# Patient Record
Sex: FEMALE | Race: WHITE
[De-identification: names, ages, dates, MRNs, and addresses within clinical notes are randomized per-mention and may not be internally consistent; named-entity substitution may affect disease eponyms.]

---

## 2019-02-07 ENCOUNTER — HOSPITAL ENCOUNTER (EMERGENCY)
Dept: HOSPITAL 8 - ED | Age: 59
Discharge: LEFT BEFORE DISCHARGE | End: 2019-02-07
Payer: COMMERCIAL

## 2019-02-07 DIAGNOSIS — Z53.21: Primary | ICD-10-CM

## 2019-11-02 ENCOUNTER — HOSPITAL ENCOUNTER (OUTPATIENT)
Facility: MEDICAL CENTER | Age: 59
End: 2019-11-04
Attending: EMERGENCY MEDICINE | Admitting: INTERNAL MEDICINE

## 2019-11-02 ENCOUNTER — APPOINTMENT (OUTPATIENT)
Dept: RADIOLOGY | Facility: MEDICAL CENTER | Age: 59
End: 2019-11-02
Attending: EMERGENCY MEDICINE

## 2019-11-02 DIAGNOSIS — E87.5 HYPERKALEMIA: ICD-10-CM

## 2019-11-02 DIAGNOSIS — R11.2 NAUSEA AND VOMITING, INTRACTABILITY OF VOMITING NOT SPECIFIED, UNSPECIFIED VOMITING TYPE: ICD-10-CM

## 2019-11-02 DIAGNOSIS — I45.9 HEART BLOCK: ICD-10-CM

## 2019-11-02 PROBLEM — R07.9 PAIN IN THE CHEST: Status: ACTIVE | Noted: 2019-11-02

## 2019-11-02 PROBLEM — I44.1 SECOND DEGREE AV BLOCK: Status: ACTIVE | Noted: 2019-11-02

## 2019-11-02 PROBLEM — D69.6 THROMBOCYTOPENIA (HCC): Status: ACTIVE | Noted: 2019-11-02

## 2019-11-02 PROBLEM — D72.829 LEUCOCYTOSIS: Status: ACTIVE | Noted: 2019-11-02

## 2019-11-02 LAB
ALBUMIN SERPL BCP-MCNC: 4.8 G/DL (ref 3.2–4.9)
ALBUMIN/GLOB SERPL: 1.5 G/DL
ALP SERPL-CCNC: 74 U/L (ref 30–99)
ALT SERPL-CCNC: 42 U/L (ref 2–50)
ANION GAP SERPL CALC-SCNC: 10 MMOL/L (ref 0–11.9)
ANION GAP SERPL CALC-SCNC: 9 MMOL/L (ref 0–11.9)
AST SERPL-CCNC: 45 U/L (ref 12–45)
BASOPHILS # BLD AUTO: 0.4 % (ref 0–1.8)
BASOPHILS # BLD: 0.06 K/UL (ref 0–0.12)
BILIRUB SERPL-MCNC: 0.5 MG/DL (ref 0.1–1.5)
BUN SERPL-MCNC: 12 MG/DL (ref 8–22)
BUN SERPL-MCNC: 13 MG/DL (ref 8–22)
CALCIUM SERPL-MCNC: 9.6 MG/DL (ref 8.5–10.5)
CALCIUM SERPL-MCNC: 9.7 MG/DL (ref 8.5–10.5)
CHLORIDE SERPL-SCNC: 100 MMOL/L (ref 96–112)
CHLORIDE SERPL-SCNC: 99 MMOL/L (ref 96–112)
CO2 SERPL-SCNC: 23 MMOL/L (ref 20–33)
CO2 SERPL-SCNC: 24 MMOL/L (ref 20–33)
CREAT SERPL-MCNC: 0.97 MG/DL (ref 0.5–1.4)
CREAT SERPL-MCNC: 1.01 MG/DL (ref 0.5–1.4)
D DIMER PPP IA.FEU-MCNC: <0.4 UG/ML (FEU) (ref 0–0.5)
EKG IMPRESSION: NORMAL
EOSINOPHIL # BLD AUTO: 0.04 K/UL (ref 0–0.51)
EOSINOPHIL NFR BLD: 0.3 % (ref 0–6.9)
ERYTHROCYTE [DISTWIDTH] IN BLOOD BY AUTOMATED COUNT: 48.3 FL (ref 35.9–50)
GLOBULIN SER CALC-MCNC: 3.3 G/DL (ref 1.9–3.5)
GLUCOSE SERPL-MCNC: 101 MG/DL (ref 65–99)
GLUCOSE SERPL-MCNC: 101 MG/DL (ref 65–99)
HCT VFR BLD AUTO: 44.3 % (ref 37–47)
HGB BLD-MCNC: 14.8 G/DL (ref 12–16)
IMM GRANULOCYTES # BLD AUTO: 0.05 K/UL (ref 0–0.11)
IMM GRANULOCYTES NFR BLD AUTO: 0.4 % (ref 0–0.9)
LYMPHOCYTES # BLD AUTO: 1.19 K/UL (ref 1–4.8)
LYMPHOCYTES NFR BLD: 8.4 % (ref 22–41)
MCH RBC QN AUTO: 34.3 PG (ref 27–33)
MCHC RBC AUTO-ENTMCNC: 33.4 G/DL (ref 33.6–35)
MCV RBC AUTO: 102.8 FL (ref 81.4–97.8)
MONOCYTES # BLD AUTO: 1 K/UL (ref 0–0.85)
MONOCYTES NFR BLD AUTO: 7.1 % (ref 0–13.4)
NEUTROPHILS # BLD AUTO: 11.76 K/UL (ref 2–7.15)
NEUTROPHILS NFR BLD: 83.4 % (ref 44–72)
NRBC # BLD AUTO: 0 K/UL
NRBC BLD-RTO: 0 /100 WBC
PLATELET # BLD AUTO: 91 K/UL (ref 164–446)
PMV BLD AUTO: 9.9 FL (ref 9–12.9)
POTASSIUM SERPL-SCNC: 6.2 MMOL/L (ref 3.6–5.5)
POTASSIUM SERPL-SCNC: 6.4 MMOL/L (ref 3.6–5.5)
PROT SERPL-MCNC: 8.1 G/DL (ref 6–8.2)
RBC # BLD AUTO: 4.31 M/UL (ref 4.2–5.4)
SODIUM SERPL-SCNC: 131 MMOL/L (ref 135–145)
SODIUM SERPL-SCNC: 134 MMOL/L (ref 135–145)
TROPONIN T SERPL-MCNC: 7 NG/L (ref 6–19)
TROPONIN T SERPL-MCNC: <6 NG/L (ref 6–19)
WBC # BLD AUTO: 14.1 K/UL (ref 4.8–10.8)

## 2019-11-02 PROCEDURE — 85025 COMPLETE CBC W/AUTO DIFF WBC: CPT

## 2019-11-02 PROCEDURE — A9270 NON-COVERED ITEM OR SERVICE: HCPCS | Performed by: INTERNAL MEDICINE

## 2019-11-02 PROCEDURE — 82533 TOTAL CORTISOL: CPT

## 2019-11-02 PROCEDURE — 93005 ELECTROCARDIOGRAM TRACING: CPT

## 2019-11-02 PROCEDURE — 700111 HCHG RX REV CODE 636 W/ 250 OVERRIDE (IP): Performed by: INTERNAL MEDICINE

## 2019-11-02 PROCEDURE — 85379 FIBRIN DEGRADATION QUANT: CPT

## 2019-11-02 PROCEDURE — 96375 TX/PRO/DX INJ NEW DRUG ADDON: CPT

## 2019-11-02 PROCEDURE — G0378 HOSPITAL OBSERVATION PER HR: HCPCS

## 2019-11-02 PROCEDURE — 700111 HCHG RX REV CODE 636 W/ 250 OVERRIDE (IP): Performed by: EMERGENCY MEDICINE

## 2019-11-02 PROCEDURE — 84484 ASSAY OF TROPONIN QUANT: CPT

## 2019-11-02 PROCEDURE — 71045 X-RAY EXAM CHEST 1 VIEW: CPT

## 2019-11-02 PROCEDURE — 99220 PR INITIAL OBSERVATION CARE,LEVL III: CPT | Mod: AI | Performed by: INTERNAL MEDICINE

## 2019-11-02 PROCEDURE — 80048 BASIC METABOLIC PNL TOTAL CA: CPT

## 2019-11-02 PROCEDURE — 99285 EMERGENCY DEPT VISIT HI MDM: CPT

## 2019-11-02 PROCEDURE — 36415 COLL VENOUS BLD VENIPUNCTURE: CPT

## 2019-11-02 PROCEDURE — 700105 HCHG RX REV CODE 258: Performed by: INTERNAL MEDICINE

## 2019-11-02 PROCEDURE — 93005 ELECTROCARDIOGRAM TRACING: CPT | Performed by: EMERGENCY MEDICINE

## 2019-11-02 PROCEDURE — 84443 ASSAY THYROID STIM HORMONE: CPT

## 2019-11-02 PROCEDURE — 700111 HCHG RX REV CODE 636 W/ 250 OVERRIDE (IP)

## 2019-11-02 PROCEDURE — 700102 HCHG RX REV CODE 250 W/ 637 OVERRIDE(OP): Performed by: INTERNAL MEDICINE

## 2019-11-02 PROCEDURE — 80053 COMPREHEN METABOLIC PANEL: CPT

## 2019-11-02 PROCEDURE — 93005 ELECTROCARDIOGRAM TRACING: CPT | Performed by: INTERNAL MEDICINE

## 2019-11-02 PROCEDURE — 96365 THER/PROPH/DIAG IV INF INIT: CPT

## 2019-11-02 PROCEDURE — 96367 TX/PROPH/DG ADDL SEQ IV INF: CPT

## 2019-11-02 RX ORDER — POLYETHYLENE GLYCOL 3350 17 G/17G
1 POWDER, FOR SOLUTION ORAL
Status: DISCONTINUED | OUTPATIENT
Start: 2019-11-02 | End: 2019-11-04 | Stop reason: HOSPADM

## 2019-11-02 RX ORDER — PROCHLORPERAZINE EDISYLATE 5 MG/ML
5-10 INJECTION INTRAMUSCULAR; INTRAVENOUS EVERY 4 HOURS PRN
Status: DISCONTINUED | OUTPATIENT
Start: 2019-11-02 | End: 2019-11-04 | Stop reason: HOSPADM

## 2019-11-02 RX ORDER — ONDANSETRON 4 MG/1
4 TABLET, ORALLY DISINTEGRATING ORAL ONCE
Status: COMPLETED | OUTPATIENT
Start: 2019-11-02 | End: 2019-11-02

## 2019-11-02 RX ORDER — REGADENOSON 0.08 MG/ML
0.4 INJECTION, SOLUTION INTRAVENOUS
Status: COMPLETED | OUTPATIENT
Start: 2019-11-02 | End: 2019-11-03

## 2019-11-02 RX ORDER — ONDANSETRON 2 MG/ML
4 INJECTION INTRAMUSCULAR; INTRAVENOUS ONCE
Status: DISPENSED | OUTPATIENT
Start: 2019-11-02 | End: 2019-11-03

## 2019-11-02 RX ORDER — GABAPENTIN 600 MG/1
1200 TABLET ORAL
COMMUNITY

## 2019-11-02 RX ORDER — ASPIRIN 300 MG/1
300 SUPPOSITORY RECTAL DAILY
Status: DISCONTINUED | OUTPATIENT
Start: 2019-11-03 | End: 2019-11-03

## 2019-11-02 RX ORDER — GABAPENTIN 300 MG/1
300 CAPSULE ORAL 3 TIMES DAILY
Status: DISCONTINUED | OUTPATIENT
Start: 2019-11-02 | End: 2019-11-04 | Stop reason: HOSPADM

## 2019-11-02 RX ORDER — SODIUM CHLORIDE 9 MG/ML
INJECTION, SOLUTION INTRAVENOUS CONTINUOUS
Status: DISCONTINUED | OUTPATIENT
Start: 2019-11-02 | End: 2019-11-03

## 2019-11-02 RX ORDER — AMINOPHYLLINE 25 MG/ML
100 INJECTION, SOLUTION INTRAVENOUS
Status: DISCONTINUED | OUTPATIENT
Start: 2019-11-02 | End: 2019-11-04 | Stop reason: HOSPADM

## 2019-11-02 RX ORDER — ACETAMINOPHEN 325 MG/1
650 TABLET ORAL EVERY 6 HOURS PRN
Status: DISCONTINUED | OUTPATIENT
Start: 2019-11-02 | End: 2019-11-04 | Stop reason: HOSPADM

## 2019-11-02 RX ORDER — PROMETHAZINE HYDROCHLORIDE 25 MG/1
12.5-25 TABLET ORAL EVERY 4 HOURS PRN
Status: DISCONTINUED | OUTPATIENT
Start: 2019-11-02 | End: 2019-11-04 | Stop reason: HOSPADM

## 2019-11-02 RX ORDER — AMOXICILLIN 250 MG
2 CAPSULE ORAL 2 TIMES DAILY
Status: DISCONTINUED | OUTPATIENT
Start: 2019-11-02 | End: 2019-11-04 | Stop reason: HOSPADM

## 2019-11-02 RX ORDER — TIZANIDINE HYDROCHLORIDE 2 MG/1
2 CAPSULE, GELATIN COATED ORAL 3 TIMES DAILY
Status: ON HOLD | COMMUNITY
End: 2019-11-04

## 2019-11-02 RX ORDER — BISACODYL 10 MG
10 SUPPOSITORY, RECTAL RECTAL
Status: DISCONTINUED | OUTPATIENT
Start: 2019-11-02 | End: 2019-11-04 | Stop reason: HOSPADM

## 2019-11-02 RX ORDER — TIZANIDINE 4 MG/1
2 TABLET ORAL EVERY 8 HOURS PRN
Status: DISCONTINUED | OUTPATIENT
Start: 2019-11-02 | End: 2019-11-03

## 2019-11-02 RX ORDER — ONDANSETRON 2 MG/ML
4 INJECTION INTRAMUSCULAR; INTRAVENOUS EVERY 4 HOURS PRN
Status: DISCONTINUED | OUTPATIENT
Start: 2019-11-02 | End: 2019-11-04 | Stop reason: HOSPADM

## 2019-11-02 RX ORDER — HYDROCODONE BITARTRATE AND ACETAMINOPHEN 10; 325 MG/1; MG/1
1 TABLET ORAL EVERY 6 HOURS
Status: ON HOLD | COMMUNITY
End: 2019-11-04

## 2019-11-02 RX ORDER — PROMETHAZINE HYDROCHLORIDE 25 MG/1
12.5-25 SUPPOSITORY RECTAL EVERY 4 HOURS PRN
Status: DISCONTINUED | OUTPATIENT
Start: 2019-11-02 | End: 2019-11-04 | Stop reason: HOSPADM

## 2019-11-02 RX ORDER — ASPIRIN 325 MG
325 TABLET ORAL DAILY
Status: DISCONTINUED | OUTPATIENT
Start: 2019-11-03 | End: 2019-11-03

## 2019-11-02 RX ORDER — ASPIRIN 81 MG/1
324 TABLET, CHEWABLE ORAL DAILY
Status: DISCONTINUED | OUTPATIENT
Start: 2019-11-03 | End: 2019-11-03

## 2019-11-02 RX ORDER — MORPHINE SULFATE 4 MG/ML
4 INJECTION, SOLUTION INTRAMUSCULAR; INTRAVENOUS ONCE
Status: COMPLETED | OUTPATIENT
Start: 2019-11-02 | End: 2019-11-02

## 2019-11-02 RX ORDER — ONDANSETRON 4 MG/1
4 TABLET, ORALLY DISINTEGRATING ORAL EVERY 4 HOURS PRN
Status: DISCONTINUED | OUTPATIENT
Start: 2019-11-02 | End: 2019-11-04 | Stop reason: HOSPADM

## 2019-11-02 RX ADMIN — DEXTROSE MONOHYDRATE 250 ML: 100 INJECTION, SOLUTION INTRAVENOUS at 19:42

## 2019-11-02 RX ADMIN — SODIUM CHLORIDE: 9 INJECTION, SOLUTION INTRAVENOUS at 21:37

## 2019-11-02 RX ADMIN — PROCHLORPERAZINE EDISYLATE 10 MG: 5 INJECTION INTRAMUSCULAR; INTRAVENOUS at 19:37

## 2019-11-02 RX ADMIN — INSULIN HUMAN 10 UNITS: 100 INJECTION, SOLUTION PARENTERAL at 19:46

## 2019-11-02 RX ADMIN — ONDANSETRON 4 MG: 4 TABLET, ORALLY DISINTEGRATING ORAL at 17:39

## 2019-11-02 RX ADMIN — MORPHINE SULFATE 4 MG: 4 INJECTION INTRAVENOUS at 18:12

## 2019-11-02 RX ADMIN — GABAPENTIN 300 MG: 300 CAPSULE ORAL at 19:35

## 2019-11-02 RX ADMIN — CALCIUM GLUCONATE 1000 MG: 98 INJECTION, SOLUTION INTRAVENOUS at 19:42

## 2019-11-02 RX ADMIN — SODIUM CHLORIDE: 9 INJECTION, SOLUTION INTRAVENOUS at 19:40

## 2019-11-02 SDOH — HEALTH STABILITY: MENTAL HEALTH: HOW OFTEN DO YOU HAVE A DRINK CONTAINING ALCOHOL?: 2-4 TIMES A MONTH

## 2019-11-02 SDOH — HEALTH STABILITY: MENTAL HEALTH: HOW OFTEN DO YOU HAVE 6 OR MORE DRINKS ON ONE OCCASION?: NEVER

## 2019-11-02 SDOH — HEALTH STABILITY: MENTAL HEALTH: HOW MANY STANDARD DRINKS CONTAINING ALCOHOL DO YOU HAVE ON A TYPICAL DAY?: 1 OR 2

## 2019-11-02 ASSESSMENT — LIFESTYLE VARIABLES
DO YOU DRINK ALCOHOL: NO
ALCOHOL_USE: YES
EVER_SMOKED: YES
HAVE YOU EVER FELT YOU SHOULD CUT DOWN ON YOUR DRINKING: NO
CONSUMPTION TOTAL: NEGATIVE
TOTAL SCORE: 0
EVER HAD A DRINK FIRST THING IN THE MORNING TO STEADY YOUR NERVES TO GET RID OF A HANGOVER: NO
ON A TYPICAL DAY WHEN YOU DRINK ALCOHOL HOW MANY DRINKS DO YOU HAVE: 2
HOW MANY TIMES IN THE PAST YEAR HAVE YOU HAD 5 OR MORE DRINKS IN A DAY: 0
TOTAL SCORE: 0
AVERAGE NUMBER OF DAYS PER WEEK YOU HAVE A DRINK CONTAINING ALCOHOL: 1
TOTAL SCORE: 0
EVER FELT BAD OR GUILTY ABOUT YOUR DRINKING: NO
HAVE PEOPLE ANNOYED YOU BY CRITICIZING YOUR DRINKING: NO

## 2019-11-02 ASSESSMENT — COGNITIVE AND FUNCTIONAL STATUS - GENERAL
MOVING FROM LYING ON BACK TO SITTING ON SIDE OF FLAT BED: A LITTLE
SUGGESTED CMS G CODE MODIFIER MOBILITY: CJ
DAILY ACTIVITIY SCORE: 24
MOBILITY SCORE: 21
WALKING IN HOSPITAL ROOM: A LITTLE
SUGGESTED CMS G CODE MODIFIER DAILY ACTIVITY: CH
CLIMB 3 TO 5 STEPS WITH RAILING: A LITTLE

## 2019-11-02 ASSESSMENT — ENCOUNTER SYMPTOMS
COUGH: 0
EYE PAIN: 0
CHILLS: 0
NAUSEA: 1
EYE REDNESS: 0
HEARTBURN: 0
PALPITATIONS: 0
VOMITING: 1
EYE DISCHARGE: 0
MYALGIAS: 0
SHORTNESS OF BREATH: 0
INSOMNIA: 0
SENSORY CHANGE: 1
ABDOMINAL PAIN: 0
STRIDOR: 0
WEIGHT LOSS: 0
SPUTUM PRODUCTION: 0
BACK PAIN: 0
DIZZINESS: 0
ORTHOPNEA: 0
NECK PAIN: 0
DEPRESSION: 0
FEVER: 0
SEIZURES: 0
HEADACHES: 0
NERVOUS/ANXIOUS: 0
BLURRED VISION: 0
DIARRHEA: 0
FOCAL WEAKNESS: 0

## 2019-11-02 ASSESSMENT — COPD QUESTIONNAIRES
DO YOU EVER COUGH UP ANY MUCUS OR PHLEGM?: NO/ONLY WITH OCCASIONAL COLDS OR INFECTIONS
COPD SCREENING SCORE: 3
DURING THE PAST 4 WEEKS HOW MUCH DID YOU FEEL SHORT OF BREATH: NONE/LITTLE OF THE TIME
HAVE YOU SMOKED AT LEAST 100 CIGARETTES IN YOUR ENTIRE LIFE: YES
IN THE PAST 12 MONTHS DO YOU DO LESS THAN YOU USED TO BECAUSE OF YOUR BREATHING PROBLEMS: DISAGREE/UNSURE

## 2019-11-02 ASSESSMENT — PATIENT HEALTH QUESTIONNAIRE - PHQ9
2. FEELING DOWN, DEPRESSED, IRRITABLE, OR HOPELESS: NOT AT ALL
SUM OF ALL RESPONSES TO PHQ9 QUESTIONS 1 AND 2: 0
1. LITTLE INTEREST OR PLEASURE IN DOING THINGS: NOT AT ALL

## 2019-11-03 ENCOUNTER — APPOINTMENT (OUTPATIENT)
Dept: RADIOLOGY | Facility: MEDICAL CENTER | Age: 59
End: 2019-11-03
Attending: INTERNAL MEDICINE

## 2019-11-03 PROBLEM — D75.89 MACROCYTOSIS: Status: ACTIVE | Noted: 2019-11-03

## 2019-11-03 PROBLEM — E78.5 HYPERLIPIDEMIA: Status: ACTIVE | Noted: 2019-11-03

## 2019-11-03 LAB
ALBUMIN SERPL BCP-MCNC: 4.6 G/DL (ref 3.2–4.9)
ALBUMIN/GLOB SERPL: 1.5 G/DL
ALP SERPL-CCNC: 70 U/L (ref 30–99)
ALT SERPL-CCNC: 39 U/L (ref 2–50)
ANION GAP SERPL CALC-SCNC: 9 MMOL/L (ref 0–11.9)
AST SERPL-CCNC: 42 U/L (ref 12–45)
BILIRUB SERPL-MCNC: 0.6 MG/DL (ref 0.1–1.5)
BUN SERPL-MCNC: 17 MG/DL (ref 8–22)
CALCIUM SERPL-MCNC: 9.7 MG/DL (ref 8.5–10.5)
CHLORIDE SERPL-SCNC: 101 MMOL/L (ref 96–112)
CHOLEST SERPL-MCNC: 244 MG/DL (ref 100–199)
CO2 SERPL-SCNC: 25 MMOL/L (ref 20–33)
CORTIS SERPL-MCNC: 47.8 UG/DL (ref 0–23)
CREAT SERPL-MCNC: 0.9 MG/DL (ref 0.5–1.4)
ERYTHROCYTE [DISTWIDTH] IN BLOOD BY AUTOMATED COUNT: 47.8 FL (ref 35.9–50)
GLOBULIN SER CALC-MCNC: 3 G/DL (ref 1.9–3.5)
GLUCOSE SERPL-MCNC: 125 MG/DL (ref 65–99)
HCT VFR BLD AUTO: 44.9 % (ref 37–47)
HDLC SERPL-MCNC: 63 MG/DL
HGB BLD-MCNC: 14.9 G/DL (ref 12–16)
LDLC SERPL CALC-MCNC: 163 MG/DL
MCH RBC QN AUTO: 34.3 PG (ref 27–33)
MCHC RBC AUTO-ENTMCNC: 33.2 G/DL (ref 33.6–35)
MCV RBC AUTO: 103.2 FL (ref 81.4–97.8)
PLATELET # BLD AUTO: 80 K/UL (ref 164–446)
PMV BLD AUTO: 10.8 FL (ref 9–12.9)
POTASSIUM SERPL-SCNC: 5.2 MMOL/L (ref 3.6–5.5)
PROT SERPL-MCNC: 7.6 G/DL (ref 6–8.2)
RBC # BLD AUTO: 4.35 M/UL (ref 4.2–5.4)
SODIUM SERPL-SCNC: 135 MMOL/L (ref 135–145)
TRIGL SERPL-MCNC: 88 MG/DL (ref 0–149)
TROPONIN T SERPL-MCNC: 11 NG/L (ref 6–19)
TSH SERPL DL<=0.005 MIU/L-ACNC: 1.05 UIU/ML (ref 0.38–5.33)
WBC # BLD AUTO: 14 K/UL (ref 4.8–10.8)

## 2019-11-03 PROCEDURE — 700102 HCHG RX REV CODE 250 W/ 637 OVERRIDE(OP): Performed by: HOSPITALIST

## 2019-11-03 PROCEDURE — G0378 HOSPITAL OBSERVATION PER HR: HCPCS

## 2019-11-03 PROCEDURE — 85027 COMPLETE CBC AUTOMATED: CPT

## 2019-11-03 PROCEDURE — 80053 COMPREHEN METABOLIC PANEL: CPT

## 2019-11-03 PROCEDURE — 84484 ASSAY OF TROPONIN QUANT: CPT

## 2019-11-03 PROCEDURE — A9502 TC99M TETROFOSMIN: HCPCS

## 2019-11-03 PROCEDURE — 700111 HCHG RX REV CODE 636 W/ 250 OVERRIDE (IP): Performed by: INTERNAL MEDICINE

## 2019-11-03 PROCEDURE — A9270 NON-COVERED ITEM OR SERVICE: HCPCS | Performed by: HOSPITALIST

## 2019-11-03 PROCEDURE — 96372 THER/PROPH/DIAG INJ SC/IM: CPT

## 2019-11-03 PROCEDURE — A9270 NON-COVERED ITEM OR SERVICE: HCPCS | Performed by: INTERNAL MEDICINE

## 2019-11-03 PROCEDURE — 700102 HCHG RX REV CODE 250 W/ 637 OVERRIDE(OP): Performed by: INTERNAL MEDICINE

## 2019-11-03 PROCEDURE — 700105 HCHG RX REV CODE 258: Performed by: INTERNAL MEDICINE

## 2019-11-03 PROCEDURE — 99226 PR SUBSEQUENT OBSERVATION CARE,LEVEL III: CPT | Performed by: HOSPITALIST

## 2019-11-03 PROCEDURE — 36415 COLL VENOUS BLD VENIPUNCTURE: CPT

## 2019-11-03 PROCEDURE — 700111 HCHG RX REV CODE 636 W/ 250 OVERRIDE (IP)

## 2019-11-03 PROCEDURE — 80061 LIPID PANEL: CPT

## 2019-11-03 RX ORDER — ATORVASTATIN CALCIUM 40 MG/1
40 TABLET, FILM COATED ORAL EVERY EVENING
Status: DISCONTINUED | OUTPATIENT
Start: 2019-11-03 | End: 2019-11-04 | Stop reason: HOSPADM

## 2019-11-03 RX ORDER — HYDROCODONE BITARTRATE AND ACETAMINOPHEN 5; 325 MG/1; MG/1
1 TABLET ORAL EVERY 8 HOURS PRN
Status: DISCONTINUED | OUTPATIENT
Start: 2019-11-03 | End: 2019-11-04

## 2019-11-03 RX ORDER — FUROSEMIDE 10 MG/ML
10 INJECTION INTRAMUSCULAR; INTRAVENOUS ONCE
Status: DISCONTINUED | OUTPATIENT
Start: 2019-11-03 | End: 2019-11-03

## 2019-11-03 RX ORDER — REGADENOSON 0.08 MG/ML
INJECTION, SOLUTION INTRAVENOUS
Status: COMPLETED
Start: 2019-11-03 | End: 2019-11-03

## 2019-11-03 RX ORDER — FUROSEMIDE 10 MG/ML
10 INJECTION INTRAMUSCULAR; INTRAVENOUS ONCE
Status: COMPLETED | OUTPATIENT
Start: 2019-11-03 | End: 2019-11-04

## 2019-11-03 RX ADMIN — REGADENOSON 0.4 MG: 0.08 INJECTION, SOLUTION INTRAVENOUS at 10:30

## 2019-11-03 RX ADMIN — HYDROCODONE BITARTRATE AND ACETAMINOPHEN 1 TABLET: 5; 325 TABLET ORAL at 16:45

## 2019-11-03 RX ADMIN — SENNOSIDES AND DOCUSATE SODIUM 2 TABLET: 8.6; 5 TABLET ORAL at 17:27

## 2019-11-03 RX ADMIN — GABAPENTIN 300 MG: 300 CAPSULE ORAL at 11:37

## 2019-11-03 RX ADMIN — SENNOSIDES AND DOCUSATE SODIUM 2 TABLET: 8.6; 5 TABLET ORAL at 04:52

## 2019-11-03 RX ADMIN — ATORVASTATIN CALCIUM 40 MG: 40 TABLET, FILM COATED ORAL at 17:27

## 2019-11-03 RX ADMIN — GABAPENTIN 300 MG: 300 CAPSULE ORAL at 04:52

## 2019-11-03 RX ADMIN — GABAPENTIN 300 MG: 300 CAPSULE ORAL at 17:27

## 2019-11-03 RX ADMIN — ENOXAPARIN SODIUM 40 MG: 40 INJECTION SUBCUTANEOUS at 04:52

## 2019-11-03 RX ADMIN — SODIUM CHLORIDE: 9 INJECTION, SOLUTION INTRAVENOUS at 07:48

## 2019-11-03 RX ADMIN — ASPIRIN 325 MG: 325 TABLET, FILM COATED ORAL at 04:52

## 2019-11-03 ASSESSMENT — ENCOUNTER SYMPTOMS
HEADACHES: 0
SENSORY CHANGE: 0
SPUTUM PRODUCTION: 0
LOSS OF CONSCIOUSNESS: 0
NECK PAIN: 0
CHILLS: 0
EYE PAIN: 0
WHEEZING: 0
NAUSEA: 0
BRUISES/BLEEDS EASILY: 0
FOCAL WEAKNESS: 0
CONSTIPATION: 0
BACK PAIN: 0
SPEECH CHANGE: 0
DIARRHEA: 0
DIAPHORESIS: 0
PALPITATIONS: 0
FEVER: 0
VOMITING: 0
SORE THROAT: 0
EYE DISCHARGE: 0
ABDOMINAL PAIN: 0
CLAUDICATION: 0
MYALGIAS: 0
DEPRESSION: 0
HEMOPTYSIS: 0
COUGH: 0
DIZZINESS: 0
WEAKNESS: 0
SHORTNESS OF BREATH: 0

## 2019-11-03 ASSESSMENT — LIFESTYLE VARIABLES: SUBSTANCE_ABUSE: 0

## 2019-11-03 NOTE — PROGRESS NOTES
Received pt report from ED RN.    Pt awake, alert, oriented X 4.  Pt resting in bed. Pt up to the bathroom with standby assist with FWW.   Bed in low locked position, call bell at the bedside, tray table & personal belongings within reach. Non-skid footwear intact. White board updated to reflect plan of care for current shift. Pt door tags reviewed. Bed Alarm ON.    Assessed patient’s Neuro Status, Comfort Level, Immediate Needs, Admission Screen, 2-RN Skin Note Completed.    Vitals WNL.    Tele SB with 2'TII HB.    Tommie Keenan

## 2019-11-03 NOTE — ASSESSMENT & PLAN NOTE
ERP discussed with Dr. Luther: could be related to her nausea/vomiting related vasovagal?  Correct hyperkalemia  11/3:  Converted to NSR.

## 2019-11-03 NOTE — ED NOTES
Pt ambulated steady to & from restroom with standby assist. Bedside report to Tele RN. Pt transported up to floor now.

## 2019-11-03 NOTE — CARE PLAN
Problem: Communication  Goal: The ability to communicate needs accurately and effectively will improve  Outcome: PROGRESSING AS EXPECTED  Intervention: Use communication aids and/or /Language Line as appropriate  Flowsheets (Taken 11/3/2019 9194)  Patient's Primary Language: Kinyarwanda  Note:   Discussed daily POC with pt utilizing . Whiteboard updated. No further questions at this time.      Problem: Knowledge Deficit  Goal: Knowledge of the prescribed therapeutic regimen will improve  Outcome: PROGRESSING AS EXPECTED  Intervention: Discuss information regarding therpeutic regimen and document in education  Note:   Discussed IV fluids with pt. Pt verbalized understanding.

## 2019-11-03 NOTE — PROGRESS NOTES
Assumed care of patient at bedside report from NOC RN. Updated on POC. Patient currently A & O x 4, Syriac speaking only,  service in use; on 2 L O2 nasal cannula; up standby assist, steady on feet with steady gait; without complaints of acute pain. Call light within reach. Whiteboard updated. Fall precautions in place. Bed locked and in lowest position. All questions answered. No other needs indicated at this time.

## 2019-11-03 NOTE — ED NOTES
Pt actively vomiting. To medicate per EMAR for n/v & hyperkalemia. VSS. Will continue to monitor.

## 2019-11-03 NOTE — PROGRESS NOTES
Telemetry Summary:    Measurements: .22/.10/.36    Rhythm: NSR with 2' Type I & Type II HB    Ectopy: PAC's    Rate: 73-94    Tommie Keenan

## 2019-11-03 NOTE — ED NOTES
Assist RN: Spoke with MD, Dr. Bob. To give morphine at this time. Lamonte Held per MD.     Per MD to notify him if patient's rhythm changes. Primary RN aware.

## 2019-11-03 NOTE — ED NOTES
Dr. Bob notified of pt's confirmed high potassium on lab repeat. To medicate for hyperkalemia & ERP to notify hospitalist.

## 2019-11-03 NOTE — PROGRESS NOTES
2 RN Skin Check    2 RN skin check complete.       Confirmed pressure ulcers found on: NONE.  New potential pressure ulcers noted on NONE. Wound consult placed No.  The following interventions in place Mepilex.    2-RN skin assessment completed this shift with NENA Esquivel.  Skin was assessed for abnormalities head to toe and noted in detail skin note.      FACE/NECK: Intact    BACK of HEAD: Intact    EARS: Intact    CHEST: Intact    ABDOMEN: Intact    BACK: Intact    BUTTOCKS/SACRUM: Intact-Blanching    GROIN: Intact    ARMS: Intact     LEGS: Intact    FEET: Intact    This completes the 2-RN skin assessment.      Tommie Keenan

## 2019-11-03 NOTE — PROGRESS NOTES
Nuclear Medicine call to check on patient heart rhythm.      Patient has been in and out of 2' Type II HB, currently NSR at 90.      Nuc Med, would like cardiology to clear and approve the Stress Test prior to accepting patient for procedure.      Pending cardiology consult, will notify day team with concerns.      Tommie Keenan

## 2019-11-03 NOTE — CARE PLAN
Problem: Communication  Goal: The ability to communicate needs accurately and effectively will improve  Outcome: PROGRESSING AS EXPECTED  Note:   Discussed POC with patient, patient agrees to participate in POC.  Patient encouraged to use call bell to ring for assistance.  Patient oriented to room, call bell, and bed controls.  Open line of communication established with the patient.      Patient is Ukrainian Speaking, requires .       Problem: Safety  Goal: Will remain free from injury  Outcome: PROGRESSING AS EXPECTED  Note:   Instructed patient to use call bell and ring for assistance prior to ambulation.  Non-Skid foot ware in place, bed in low locked position, call bell and personal belongings within reach.       Patient understands to ring call bell for assistance.    Goal: Will remain free from falls  Outcome: PROGRESSING AS EXPECTED  Note:   Call for assistance, bed alarm on.       Problem: Knowledge Deficit  Goal: Knowledge of disease process/condition, treatment plan, diagnostic tests, and medications will improve  Outcome: PROGRESSING AS EXPECTED  Note:   Discussed plan of care and medications with patient.  Patient verbalizes understandings of treatment regimen.      Patient  Mike at the bedside discussing POC>       Problem: Pain Management  Goal: Pain level will decrease to patient's comfort goal  Outcome: PROGRESSING AS EXPECTED  Note:   Intermittent lower ABD pain associated with nausea.

## 2019-11-03 NOTE — ED NOTES
Med Rec Updated and Complete per Pt and family at bedside with RX bottles (returned)  Allergies Reviewed  No PO ABX last 14 days.    Pt states she ran out of her Norco this morning when she took her last dose.    Pt denies OTC medication at this time.

## 2019-11-03 NOTE — ED PROVIDER NOTES
ED Provider Note    HPI: Patient is a 59-year-old female who presented to the emergency department accompanied by her  November 2, 2019 at 5:19 PM with a chief complaint of jaw pain left arm pain and right leg pain.  Para graph please note that the patient speaks very limited English and her  translates per    Patient does have chronic low back pain for which she takes Norco.  She developed the above symptoms earlier today.  She is had some dizziness.  No fever no chills no cough.  No change in bladder bowel habits.  Jaw pain and left arm pain seem to come and go.  She is had no pain in the chest as far as I can determine.  No abdominal pain no leg swelling.  No shortness of breath.  No other somatic compl        Review of Systems: Positive for jaw pain left arm pain that is intermittent negative shortness of breath chest pain abdominal pain leg swelling.  Review of systems reviewed with patient, all other systems negative     Past medical/surgical history: Chronic back pain ankle fracture     Medications: Zanaflex Norco Neurontin     Allergies: None     Social History: Former history of smoking occasional use of alcohol           Physical exam: Pleasant female awake alert  Vital signs: Temperature 97.9 pulse 65 respirations 22 blood pressure 135/38 pulse oximetry 93%  EYES: Pupils appear to be equal and reactive to light extraocular movements were intact.  Conjunctiva and sclera clear, eyelids normal bilaterally  Neck: Trachea midline no cervical masses seen or palpated.  Normal range of motion no meningeal signs elicited  Cardiac: Regular rate and rhythm S1-S2 present no S3 or S4 heard.  No murmurs rubs or gallops heard.  No edema or varicosities.   Lungs: Clear to auscultation with good aeration throughout.  No wheezes no rales no rhonchi.  Patient's chest wall moves symmetrically with each respiratory effort.  Patient was not making use of accessory muscles respiration and breathing  Abdomen: .   Soft nontender to palpation no rebound or guarding elicited no organomegaly or pulsatile abdominal masses identified  Musculoskeletal: No pain with palpation or movement of muscle bone or joint.  No musculoskeletal deformities identified.  Neurologic: Patient is awake and alert answers questions appropriately.  Moves all 4 extremities independently.  Motor strength sensation grossly normal throughout  Skin: Mucous membranes moist.  No rash or lesion seen, no palpable dermatologic lesions identified per  Psychiatric: Patient did not appear to be anxious, not obviously delusional or halluctinating    EKG obtained on arrival (interpretation) twelve-lead EKG sinus rhythm rate 61.  Morphology P waves QRS complexes T waves unremarkable.  Patient appears to have some sort of second-degree heart block but I had difficulty determining whether was type I or type II.  There is no evidence of ST elevation or depression.  She had some nonspecific ST changes diffusely.  Interpreted an abnormal EKG but not indicating acute ischemia.  There may be an acute dysrhythmia but I am uncertain at this time.    I medially contacted cardiology and Dr. Luther reviewed the tracing.  He also was uncertain as to whether this represented Mobitz type I or type II and thought it might simply represent vagal changes due to the patient's nausea and vomiting.  I went in the room and watch the patient on the monitor for continuous 5 minutes and the patient remained in sinus rhythm the entire time    Patient given morphine and Zofran with some improvement.    Laboratory studies obtained (please see lab sheet for all results) significant findings included a moderate leukocytosis of 14.1 of uncertain significance.  There is no increase in immature granulocytes making a systemic infection less likely.  Potassium was somewhat elevated at 6.2 but renal function is normal.  Troponin was normal.    Chest x-ray obtained; no acute abnormalities were seen.    I  have ordered a repeat potassium blood draw to check and see if this was an error, should her potassium remain elevated she will need to be admitted to a telemetry bed for cardiac monitoring.  She will need to be admitted for monitoring in any case as the cardiologist was concerned this could represent intermittent second-degree heart block perhaps Mobitz type II.  Her signs and symptoms do not seem to go along with aortic disease or pulmonary embolism the patient does not appear to be infected at this time.  I would characterize her chest pain is having at least a moderate likelihood of cardiac etiology    Further care and hospital course per attending physician summary    Impression 1) second-degree heart block, possible  2) nausea and vomiting    Addendum, 7:30 PM November 2.  Repeat blood drawn actually showed a slight increase in the patient's potassium at 6.4 with continued normal renal function.  Patient is treated for hyperkalemia per pharmacy protocol.  I spoke with the hospitalist at this time and he is aware of the above and will follow.  At this time the cause the patient's hyperkalemia is not clear    Additional impression hyperkalemia

## 2019-11-03 NOTE — ED NOTES
Report given to florencio RN, pt on monitor , resting denies needs at this time care assumed by florencio

## 2019-11-03 NOTE — ASSESSMENT & PLAN NOTE
Patient received dextrose and insulin in ER  Follow cmp in am  Monitor on tele  Concern for hypoaldosteronism:  No exogenous K use, normal renal function.  Ordered plasma renin, aldosterone and cortisol after IV lasix dose for a.m. testing.

## 2019-11-03 NOTE — H&P
Hospital Medicine History & Physical Note    Date of Service  11/2/2019    Primary Care Physician  PRABHA Carrasco.    Consultants  Dr. Luther    Code Status  full    Chief Complaint  Nausea, vomiting, chest pain    History of Presenting Illness  59 y.o. female with no significant past medical history, Emirati-speaking who presented 11/2/2019 with nausea and vomiting started earlier today.  Associated with tingling numbness sensation over her face and left arm area.  Earlier today she also had episode of chest pain.  So she finally decided to come to ER.  In the ER she has EKG done and show Mobitz type II.  Dr. Luther was consulted by ER and he was not sure whether this is a real heart block or just related to vasovagal event.  She was also found to have hyperkalemia with potassium of 6.2.  She will be admitted to telemetry floor for further evaluation and management.    Review of Systems  Review of Systems   Constitutional: Negative for chills, fever and weight loss.   HENT: Negative for congestion and nosebleeds.    Eyes: Negative for blurred vision, pain, discharge and redness.   Respiratory: Negative for cough, sputum production, shortness of breath and stridor.    Cardiovascular: Positive for chest pain. Negative for palpitations and orthopnea.   Gastrointestinal: Positive for nausea and vomiting. Negative for abdominal pain, diarrhea and heartburn.   Genitourinary: Negative for dysuria, frequency and urgency.   Musculoskeletal: Negative for back pain, myalgias and neck pain.   Skin: Negative for itching and rash.   Neurological: Positive for sensory change. Negative for dizziness, focal weakness, seizures and headaches.   Psychiatric/Behavioral: Negative for depression. The patient is not nervous/anxious and does not have insomnia.        Past Medical History   has no past medical history of Breast cancer (HCC).    Surgical History   has a past surgical history that includes pr c-sec only,prev c-sec and  ankle orif (1/27/2011).     Family History  family history includes Hypertension in her father and mother.     Social History   reports that she has quit smoking. She does not have any smokeless tobacco history on file. She reports that she drinks alcohol. She reports that she does not use drugs.    Allergies  No Known Allergies    Medications  Prior to Admission Medications   Prescriptions Last Dose Informant Patient Reported? Taking?   gabapentin (NEURONTIN) 300 MG CAPS   No No   Sig: Take 1 Cap by mouth 3 times a day.   hydrocodone-acetaminophen (NORCO) 5-325 MG TABS per tablet   No No   Sig: Take 1-2 Tabs by mouth every 6 hours as needed (for pain).   tizanidine (ZANAFLEX) 2 MG capsule   Yes Yes   Sig: Take 2 mg by mouth 3 times a day.      Facility-Administered Medications: None       Physical Exam  Temp:  [36.6 °C (97.9 °F)] 36.6 °C (97.9 °F)  Pulse:  [65] 65  Resp:  [22] 22  BP: (135)/(38) 135/38  SpO2:  [93 %] 93 %    Physical Exam   Constitutional: She is oriented to person, place, and time. No distress.   HENT:   Head: Normocephalic and atraumatic.   Mouth/Throat: Oropharynx is clear and moist.   Eyes: Pupils are equal, round, and reactive to light. Conjunctivae and EOM are normal.   Neck: Normal range of motion. Neck supple. No tracheal deviation present. No thyromegaly present.   Cardiovascular: Normal rate and regular rhythm.   No murmur heard.  Pulmonary/Chest: Effort normal and breath sounds normal. No respiratory distress. She has no wheezes.   Abdominal: Soft. Bowel sounds are normal. She exhibits no distension. There is no tenderness.   Musculoskeletal: She exhibits no edema or tenderness.   Neurological: She is alert and oriented to person, place, and time. No cranial nerve deficit.   Skin: Skin is warm and dry. She is not diaphoretic. No erythema.   Psychiatric: She has a normal mood and affect. Her behavior is normal. Thought content normal.   Vitals reviewed.      Laboratory:  Recent Labs      11/02/19  1800   WBC 14.1*   RBC 4.31   HEMOGLOBIN 14.8   HEMATOCRIT 44.3   .8*   MCH 34.3*   MCHC 33.4*   RDW 48.3   PLATELETCT 91*   MPV 9.9     Recent Labs     11/02/19  1800   SODIUM 134*   POTASSIUM 6.2*   CHLORIDE 100   CO2 24   GLUCOSE 101*   BUN 12   CREATININE 0.97   CALCIUM 9.7     Recent Labs     11/02/19  1800   ALTSGPT 42   ASTSGOT 45   ALKPHOSPHAT 74   TBILIRUBIN 0.5   GLUCOSE 101*         No results for input(s): NTPROBNP in the last 72 hours.      Recent Labs     11/02/19  1800   TROPONINT <6       Urinalysis:    No results found     Imaging:  DX-CHEST-PORTABLE (1 VIEW)   Final Result      No acute cardiopulmonary findings.      NM-CARDIAC STRESS TEST    (Results Pending)         Assessment/Plan:  I anticipate this patient is appropriate for observation status at this time.    Second degree AV block- (present on admission)  Assessment & Plan  ERP discussed with Dr. Luther: could be related to her nausea/vomiting related vasovagal?  Correct hyperkalemia  Repeat EKG once hyperkalemia is improved    Hyperkalemia- (present on admission)  Assessment & Plan  Patient received dextrose and insulin in ER  Follow cmp in am  Monitor on tele    Thrombocytopenia (HCC)- (present on admission)  Assessment & Plan  Unclear etiology  Follow cbc in am    Leucocytosis- (present on admission)  Assessment & Plan  Likely reactive  Follow cbc in am    Pain in the chest- (present on admission)  Assessment & Plan  Risk for CAD  Trend trop and ekg   Ordered ddimer, if elevated then will check CTPE study  NPO  Stress test in am  Check lipid panel      VTE prophylaxis: lovenox

## 2019-11-04 ENCOUNTER — PATIENT OUTREACH (OUTPATIENT)
Dept: HEALTH INFORMATION MANAGEMENT | Facility: OTHER | Age: 59
End: 2019-11-04

## 2019-11-04 VITALS
BODY MASS INDEX: 28.8 KG/M2 | OXYGEN SATURATION: 94 % | HEART RATE: 84 BPM | TEMPERATURE: 98.2 F | RESPIRATION RATE: 18 BRPM | WEIGHT: 152.56 LBS | HEIGHT: 61 IN | SYSTOLIC BLOOD PRESSURE: 142 MMHG | DIASTOLIC BLOOD PRESSURE: 87 MMHG

## 2019-11-04 PROBLEM — E87.5 HYPERKALEMIA: Status: RESOLVED | Noted: 2019-11-02 | Resolved: 2019-11-04

## 2019-11-04 PROBLEM — I44.1 SECOND DEGREE AV BLOCK: Status: RESOLVED | Noted: 2019-11-02 | Resolved: 2019-11-04

## 2019-11-04 PROBLEM — R07.9 PAIN IN THE CHEST: Status: RESOLVED | Noted: 2019-11-02 | Resolved: 2019-11-04

## 2019-11-04 PROBLEM — D72.829 LEUCOCYTOSIS: Status: RESOLVED | Noted: 2019-11-02 | Resolved: 2019-11-04

## 2019-11-04 PROBLEM — D51.9 B12 DEFICIENCY ANEMIA: Status: ACTIVE | Noted: 2019-11-04

## 2019-11-04 LAB
ANION GAP SERPL CALC-SCNC: 9 MMOL/L (ref 0–11.9)
BASOPHILS # BLD AUTO: 0.4 % (ref 0–1.8)
BASOPHILS # BLD: 0.04 K/UL (ref 0–0.12)
BUN SERPL-MCNC: 21 MG/DL (ref 8–22)
CALCIUM SERPL-MCNC: 9.7 MG/DL (ref 8.5–10.5)
CHLORIDE SERPL-SCNC: 105 MMOL/L (ref 96–112)
CO2 SERPL-SCNC: 26 MMOL/L (ref 20–33)
COMMENT 1642: NORMAL
CORTIS SERPL-MCNC: 23.7 UG/DL (ref 0–23)
CREAT SERPL-MCNC: 0.65 MG/DL (ref 0.5–1.4)
EOSINOPHIL # BLD AUTO: 0.08 K/UL (ref 0–0.51)
EOSINOPHIL NFR BLD: 0.9 % (ref 0–6.9)
ERYTHROCYTE [DISTWIDTH] IN BLOOD BY AUTOMATED COUNT: 50.1 FL (ref 35.9–50)
FOLATE SERPL-MCNC: 10.6 NG/ML
GLUCOSE SERPL-MCNC: 124 MG/DL (ref 65–99)
HCT VFR BLD AUTO: 42.4 % (ref 37–47)
HGB BLD-MCNC: 14 G/DL (ref 12–16)
IMM GRANULOCYTES # BLD AUTO: 0.06 K/UL (ref 0–0.11)
IMM GRANULOCYTES NFR BLD AUTO: 0.7 % (ref 0–0.9)
LYMPHOCYTES # BLD AUTO: 1.93 K/UL (ref 1–4.8)
LYMPHOCYTES NFR BLD: 21.2 % (ref 22–41)
MCH RBC QN AUTO: 34.1 PG (ref 27–33)
MCHC RBC AUTO-ENTMCNC: 33 G/DL (ref 33.6–35)
MCV RBC AUTO: 103.2 FL (ref 81.4–97.8)
MONOCYTES # BLD AUTO: 1.19 K/UL (ref 0–0.85)
MONOCYTES NFR BLD AUTO: 13.1 % (ref 0–13.4)
MORPHOLOGY BLD-IMP: NORMAL
NEUTROPHILS # BLD AUTO: 5.81 K/UL (ref 2–7.15)
NEUTROPHILS NFR BLD: 63.7 % (ref 44–72)
NRBC # BLD AUTO: 0 K/UL
NRBC BLD-RTO: 0 /100 WBC
PLATELET # BLD AUTO: 32 K/UL (ref 164–446)
PLATELET BLD QL SMEAR: NORMAL
PLATELETS.RETICULATED NFR BLD AUTO: 7 K/UL (ref 0.6–13.1)
PMV BLD AUTO: 10.8 FL (ref 9–12.9)
POTASSIUM SERPL-SCNC: 3.8 MMOL/L (ref 3.6–5.5)
RBC # BLD AUTO: 4.11 M/UL (ref 4.2–5.4)
RBC BLD AUTO: NORMAL
SODIUM SERPL-SCNC: 140 MMOL/L (ref 135–145)
VIT B12 SERPL-MCNC: 346 PG/ML (ref 211–911)
WBC # BLD AUTO: 9.1 K/UL (ref 4.8–10.8)

## 2019-11-04 PROCEDURE — 82746 ASSAY OF FOLIC ACID SERUM: CPT

## 2019-11-04 PROCEDURE — 82607 VITAMIN B-12: CPT

## 2019-11-04 PROCEDURE — 99217 PR OBSERVATION CARE DISCHARGE: CPT | Performed by: HOSPITALIST

## 2019-11-04 PROCEDURE — A9270 NON-COVERED ITEM OR SERVICE: HCPCS | Performed by: HOSPITALIST

## 2019-11-04 PROCEDURE — 700111 HCHG RX REV CODE 636 W/ 250 OVERRIDE (IP): Performed by: HOSPITALIST

## 2019-11-04 PROCEDURE — 85025 COMPLETE CBC W/AUTO DIFF WBC: CPT

## 2019-11-04 PROCEDURE — 700102 HCHG RX REV CODE 250 W/ 637 OVERRIDE(OP): Performed by: INTERNAL MEDICINE

## 2019-11-04 PROCEDURE — G0378 HOSPITAL OBSERVATION PER HR: HCPCS

## 2019-11-04 PROCEDURE — 84244 ASSAY OF RENIN: CPT

## 2019-11-04 PROCEDURE — 700102 HCHG RX REV CODE 250 W/ 637 OVERRIDE(OP): Performed by: HOSPITALIST

## 2019-11-04 PROCEDURE — 85055 RETICULATED PLATELET ASSAY: CPT

## 2019-11-04 PROCEDURE — 36415 COLL VENOUS BLD VENIPUNCTURE: CPT

## 2019-11-04 PROCEDURE — 96375 TX/PRO/DX INJ NEW DRUG ADDON: CPT

## 2019-11-04 PROCEDURE — 96372 THER/PROPH/DIAG INJ SC/IM: CPT

## 2019-11-04 PROCEDURE — A9270 NON-COVERED ITEM OR SERVICE: HCPCS | Performed by: INTERNAL MEDICINE

## 2019-11-04 PROCEDURE — 82533 TOTAL CORTISOL: CPT

## 2019-11-04 PROCEDURE — 80048 BASIC METABOLIC PNL TOTAL CA: CPT

## 2019-11-04 PROCEDURE — 82088 ASSAY OF ALDOSTERONE: CPT

## 2019-11-04 RX ORDER — CYANOCOBALAMIN 1000 UG/ML
1000 INJECTION, SOLUTION INTRAMUSCULAR; SUBCUTANEOUS
Status: DISCONTINUED | OUTPATIENT
Start: 2019-11-04 | End: 2019-11-04 | Stop reason: HOSPADM

## 2019-11-04 RX ORDER — ATORVASTATIN CALCIUM 40 MG/1
40 TABLET, FILM COATED ORAL EVERY EVENING
Qty: 30 TAB | Refills: 3 | Status: SHIPPED | OUTPATIENT
Start: 2019-11-04 | End: 2022-06-15

## 2019-11-04 RX ORDER — CHOLECALCIFEROL (VITAMIN D3) 125 MCG
1000 CAPSULE ORAL DAILY
Status: DISCONTINUED | OUTPATIENT
Start: 2019-11-05 | End: 2019-11-04 | Stop reason: HOSPADM

## 2019-11-04 RX ADMIN — GABAPENTIN 300 MG: 300 CAPSULE ORAL at 04:22

## 2019-11-04 RX ADMIN — CYANOCOBALAMIN 1000 MCG: 1000 INJECTION, SOLUTION INTRAMUSCULAR; SUBCUTANEOUS at 09:18

## 2019-11-04 RX ADMIN — FUROSEMIDE 10 MG: 10 INJECTION, SOLUTION INTRAMUSCULAR; INTRAVENOUS at 09:17

## 2019-11-04 RX ADMIN — HYDROCODONE BITARTRATE AND ACETAMINOPHEN 1 TABLET: 5; 325 TABLET ORAL at 08:24

## 2019-11-04 RX ADMIN — SENNOSIDES AND DOCUSATE SODIUM 2 TABLET: 8.6; 5 TABLET ORAL at 04:22

## 2019-11-04 RX ADMIN — GABAPENTIN 300 MG: 300 CAPSULE ORAL at 13:34

## 2019-11-04 NOTE — PROGRESS NOTES
Hospital Medicine Daily Progress Note    Date of Service  11/3/2019    Chief Complaint  59 y.o. female admitted 11/2/2019 with N/V/chest pain    Hospital Course    11/3:  Used  #[id number].  This 58 yo female with no prior cardiac disease states she had been taking diet Mexican medications for 3 months with K, but quit 1 week ago, no other K intake, presented with K 6.2 and N/V/chest pain.  She was in Mobitz 2 heart block on admission EKG.  Her K was reduced with insulin and D50.  She converted to NSR overnight.  However, normal renal function, no exogenous K per patient.  She eats avocados, bananas only occasionally.  No K supplements.  Ordered for hypoaldosteronism work up with IV lasix, aldosterone, cortisol and renin levels 30 minutes after loop diuretic. *        Consultants/Specialty  Dr. Link reviewed symptoms and states no further cardiology work up needed.      Code Status  full    Disposition  Lives  With .  Ambulates well.  No needs anticipated once medically cleared.    Review of Systems  Review of Systems   Constitutional: Negative for chills, diaphoresis, fever and malaise/fatigue.   HENT: Negative for congestion and sore throat.    Eyes: Negative for pain and discharge.   Respiratory: Negative for cough, hemoptysis, sputum production, shortness of breath and wheezing.    Cardiovascular: Negative for chest pain, palpitations, claudication and leg swelling.   Gastrointestinal: Negative for abdominal pain, constipation, diarrhea, melena, nausea and vomiting.   Genitourinary: Negative for dysuria, frequency and urgency.   Musculoskeletal: Negative for back pain, joint pain, myalgias and neck pain.   Skin: Negative for itching and rash.   Neurological: Negative for dizziness, sensory change, speech change, focal weakness, loss of consciousness, weakness and headaches.   Endo/Heme/Allergies: Does not bruise/bleed easily.   Psychiatric/Behavioral: Negative for depression, substance  abuse and suicidal ideas.        Physical Exam  Temp:  [36 °C (96.8 °F)-37.3 °C (99.2 °F)] 36.8 °C (98.3 °F)  Pulse:  [52-92] 91  Resp:  [16-20] 20  BP: (124-146)/(51-95) 146/95  SpO2:  [90 %-97 %] 91 %    Physical Exam  Vitals signs and nursing note reviewed.   Constitutional:       General: She is not in acute distress.     Appearance: She is well-developed. She is not diaphoretic.   HENT:      Head: Normocephalic and atraumatic.      Nose: Nose normal.      Mouth/Throat:      Pharynx: No oropharyngeal exudate.   Eyes:      General: No scleral icterus.        Right eye: No discharge.         Left eye: No discharge.      Conjunctiva/sclera: Conjunctivae normal.      Pupils: Pupils are equal, round, and reactive to light.   Neck:      Musculoskeletal: Normal range of motion and neck supple.      Thyroid: No thyromegaly.      Vascular: No JVD.      Trachea: No tracheal deviation.   Cardiovascular:      Rate and Rhythm: Normal rate and regular rhythm.      Heart sounds: Normal heart sounds. No murmur. No friction rub. No gallop.    Pulmonary:      Effort: Pulmonary effort is normal. No respiratory distress.      Breath sounds: Normal breath sounds. No stridor. No wheezing or rales.   Chest:      Chest wall: No tenderness.   Abdominal:      General: Bowel sounds are normal. There is no distension.      Palpations: Abdomen is soft. There is no mass.      Tenderness: There is no tenderness. There is no guarding or rebound.   Musculoskeletal: Normal range of motion.         General: No tenderness.   Lymphadenopathy:      Cervical: No cervical adenopathy.   Skin:     General: Skin is warm and dry.      Findings: No erythema or rash.   Neurological:      Mental Status: She is alert and oriented to person, place, and time.      Cranial Nerves: No cranial nerve deficit.      Motor: No abnormal muscle tone.      Coordination: Coordination normal.   Psychiatric:         Behavior: Behavior normal.         Thought Content:  Thought content normal.         Judgment: Judgment normal.         Fluids    Intake/Output Summary (Last 24 hours) at 11/3/2019 1841  Last data filed at 11/3/2019 0600  Gross per 24 hour   Intake 1077.67 ml   Output --   Net 1077.67 ml       Laboratory  Recent Labs     11/02/19  1800 11/03/19  0000   WBC 14.1* 14.0*   RBC 4.31 4.35   HEMOGLOBIN 14.8 14.9   HEMATOCRIT 44.3 44.9   .8* 103.2*   MCH 34.3* 34.3*   MCHC 33.4* 33.2*   RDW 48.3 47.8   PLATELETCT 91* 80*   MPV 9.9 10.8     Recent Labs     11/02/19  1800 11/02/19  1842 11/03/19  0000   SODIUM 134* 131* 135   POTASSIUM 6.2* 6.4* 5.2   CHLORIDE 100 99 101   CO2 24 23 25   GLUCOSE 101* 101* 125*   BUN 12 13 17   CREATININE 0.97 1.01 0.90   CALCIUM 9.7 9.6 9.7             Recent Labs     11/03/19  0000   TRIGLYCERIDE 88   HDL 63   *       Imaging  NM-CARDIAC STRESS TEST   Final Result      DX-CHEST-PORTABLE (1 VIEW)   Final Result      No acute cardiopulmonary findings.           Assessment/Plan  Hyperlipidemia  Assessment & Plan  Started lipitor 40 mg daily.    Macrocytosis  Assessment & Plan  Ordered B12, iron, folate in a.m.    Second degree AV block- (present on admission)  Assessment & Plan  ERP discussed with Dr. Luther: could be related to her nausea/vomiting related vasovagal?  Correct hyperkalemia  11/3:  Converted to NSR.    Hyperkalemia- (present on admission)  Assessment & Plan  Patient received dextrose and insulin in ER  Follow cmp in am  Monitor on tele  Concern for hypoaldosteronism:  No exogenous K use, normal renal function.  Ordered plasma renin, aldosterone and cortisol after IV lasix dose for a.m. testing.    Thrombocytopenia (HCC)- (present on admission)  Assessment & Plan  Unclear etiology  Follow cbc in am    Leucocytosis- (present on admission)  Assessment & Plan  Likely reactive  Follow cbc in am    Pain in the chest- (present on admission)  Assessment & Plan  Risk for CAD  Trend trop and ekg   Negative d-dimer  Stress test  negative  Check lipid panel       VTE prophylaxis: lovenox

## 2019-11-04 NOTE — PROGRESS NOTES
Monitor Summary  Rhythm: SR  Rate: 86 - 98  Ectopy: 2' type II beats; down to 45 non-sustaining  0.22 / 0.10 / 0.30

## 2019-11-04 NOTE — DISCHARGE INSTRUCTIONS
Discharge Instructions    Discharged to home by car with relative. Discharged via walking, hospital escort: Refused.  Special equipment needed: Not Applicable    Be sure to schedule a follow-up appointment with your primary care doctor or any specialists as instructed.     Discharge Plan:   Diet Plan: Discussed  Activity Level: Discussed  Confirmed Follow up Appointment: Appointment Scheduled  Confirmed Symptoms Management: Discussed  Medication Reconciliation Updated: Yes  Influenza Vaccine Indication: Patient Refuses    I understand that a diet low in cholesterol, fat, and sodium is recommended for good health. Unless I have been given specific instructions below for another diet, I accept this instruction as my diet prescription.   Other diet: cardiac    Special Instructions: None    · Is patient discharged on Warfarin / Coumadin?   No     Depression / Suicide Risk    As you are discharged from this [facility] facility, it is important to learn how to keep safe from harming yourself.    Recognize the warning signs:  · Abrupt changes in personality, positive or negative- including increase in energy   · Giving away possessions  · Change in eating patterns- significant weight changes-  positive or negative  · Change in sleeping patterns- unable to sleep or sleeping all the time   · Unwillingness or inability to communicate  · Depression  · Unusual sadness, discouragement and loneliness  · Talk of wanting to die  · Neglect of personal appearance   · Rebelliousness- reckless behavior  · Withdrawal from people/activities they love  · Confusion- inability to concentrate     If you or a loved one observes any of these behaviors or has concerns about self-harm, here's what you can do:  · Talk about it- your feelings and reasons for harming yourself  · Remove any means that you might use to hurt yourself (examples: pills, rope, extension cords, firearm)  · Get professional help from the community (Mental Health,  Substance Abuse, psychological counseling)  · Do not be alone:Call your Safe Contact- someone whom you trust who will be there for you.  · Call your local CRISIS HOTLINE 082-8948 or 298-742-3493  · Call your local Children's Mobile Crisis Response Team [place] (580) 693-2398 or www.Folkstr  · Call the toll free National Suicide Prevention Hotlines   · National Suicide Prevention Lifeline 589-532-JYDO (5970)  · [place] Hope Line Network 800-SUICIDE (979-6778)        Hyperkalemia  Introduction  Hyperkalemia is when you have too much potassium in your blood. Potassium is normally removed (excreted) from your body by your kidneys. If there is too much potassium in your blood, it can affect how your heart works.  Follow these instructions at home:  · Take medicines only as told by your doctor.  · Do not take any supplements, natural products, herbs, or vitamins unless your doctor says it is okay.  · Limit your alcohol intake as told by your doctor.  · Stop illegal drug use. If you need help quitting, ask your doctor.  · Keep all follow-up visits as told by your doctor. This is important.  · If you have kidney disease, you may need to follow a low potassium diet. A  (dietitian) can help you.  Contact a doctor if:  · Your heartbeat is not regular or very slow.  · You feel dizzy (light-headed).  · You feel weak.  · You feel sick to your stomach (nauseous).  · You have tingling in your hands or feet.  · You cannot feel your hands or feet.  Get help right away if:  · You are short of breath.  · You have chest pain.  · You pass out (faint).  · You cannot move your muscles.  This information is not intended to replace advice given to you by your health care provider. Make sure you discuss any questions you have with your health care provider.  Document Released: 12/18/2006 Document Revised: 05/25/2017 Document Reviewed: 03/25/2015  © 2017 Elsevier

## 2019-11-04 NOTE — CARE PLAN
Problem: Communication  Goal: The ability to communicate needs accurately and effectively will improve  Outcome: PROGRESSING AS EXPECTED  Note:   Communication board updated. All pt questions answered. No further questions at this time. Pt encouraged to voice feelings and ask questions as they arise. Ipad  being utilized     Problem: Safety  Goal: Will remain free from injury  Outcome: PROGRESSING AS EXPECTED  Note:   Bed locked and in lowest position. Treaded socks on. Call light and belongings within reach. Patient educated to call for assistance. Patient verbalized understanding. Hourly rounding in place.

## 2019-11-04 NOTE — PROGRESS NOTES
Assumed care at 0700, bedside report received from day shift RN. Pt is SR on the telemetry monitor. Patient is AO x 4 and is resting in bed with even respiraitons. Initial assessment completed and orders reviewed. POC addressed with patient. Call light within reach and hourly rounding in place. No further questions at this time. No nausea at this time.

## 2019-11-04 NOTE — DISCHARGE SUMMARY
Discharge Summary    CHIEF COMPLAINT ON ADMISSION  Chief Complaint   Patient presents with   • N/V   • Dizziness   • Numbness     jaw, left arm, right leg, woke up with numbess   • Chest Pain     left sided, radiates into left shoulder at times   • Shortness of Breath       Reason for Admission  Vomiting; Facial Numbness; L arm a*     Admission Date  11/2/2019    CODE STATUS  Full Code    HPI & HOSPITAL COURSE  This is a 59 y.o. female here with N/V/chest pain, numbness found to have hyperkalemia 6.4.  11/3:  Used  #[id number].  This 60 yo female with no prior cardiac disease states she had been taking diet [place] medications for 3 months with K, but quit 1 week ago, no other K intake, presented with K 6.2 and N/V/chest pain.  She was in [place] 2 heart block on admission EKG.  Her K was reduced with insulin and D50.  She converted to NSR overnight.  However, normal renal function, no exogenous K per patient.  She eats avocados, bananas only occasionally.  No K supplements.  Ordered for hypoaldosteronism work up with IV lasix, aldosterone, cortisol and renin levels 30 minutes after loop diuretic. *     The patient had macrocytosis anemia, found to have vitamin B12 deficiency, IM B12 1000mcg given and po for home.  Her platelets dropped with heparin tid dosing, heparin discontinued.  Her K improved to 3.5 on day of discharge.  The patient remained in NSR on monitor, tolerating diet well, ambulating well.  It is unclear if her diet medications from [place] that contained K could have caused her hyperkalemia.      She is to stop her diet medication, no salt substitutes.  She will need repeat CBC to ensure recovery of platelets, likely dropped due to heparin, but on admission, platelets 80s.  No ASA due to low platelets.  Lipid panel with elevated cholesterol and LDL, started on lipitor 40mg po daily.    Therefore, she is discharged in good and stable condition to home with close outpatient  follow-up.    The patient met 2-midnight criteria for an inpatient stay at the time of discharge.    Discharge Date  11/4/2019    FOLLOW UP ITEMS POST DISCHARGE  Follow up CBC,BMP and cortisol, aldosterone, renin test results with PCP in 1 week.    DISCHARGE DIAGNOSES  Active Problems:    Thrombocytopenia (HCC) POA: Yes    Macrocytosis POA: Yes    Hyperlipidemia POA: Yes    B12 deficiency anemia POA: Yes  Resolved Problems:    Pain in the chest POA: Yes    Leucocytosis POA: Yes    Hyperkalemia POA: Yes    Second degree AV block POA: Yes      FOLLOW UP  No future appointments.  No follow-up provider specified.    MEDICATIONS ON DISCHARGE     Medication List      START taking these medications      Instructions   atorvastatin 40 MG Tabs  Commonly known as:  LIPITOR   Take 1 Tab by mouth every evening.  Dose:  40 mg     cyanocobalamin 1000 MCG Tabs  Start taking on:  November 5, 2019  Commonly known as:  VITAMIN B12   Take 1 Tab by mouth every day.  Dose:  1,000 mcg        CONTINUE taking these medications      Instructions   gabapentin 300 MG Caps  Commonly known as:  NEURONTIN   Take 300 mg by mouth 3 times a day.  Dose:  300 mg        STOP taking these medications    HYDROcodone/acetaminophen  MG Tabs  Commonly known as:  NORCO     tizanidine 2 MG capsule  Commonly known as:  ZANAFLEX            Allergies  No Known Allergies    DIET  Orders Placed This Encounter   Procedures   • Diet Order Cardiac     Standing Status:   Standing     Number of Occurrences:   1     Order Specific Question:   Diet:     Answer:   Cardiac [6]       ACTIVITY  As tolerated.  Weight bearing as tolerated    CONSULTATIONS  Dr. Link, phone consult    PROCEDURES   Myocardial Perfusion   Report   NUCLEAR IMAGING INTERPRETATION   No evidence of significant jeopardized viable myocardium or prior myocardial    infarction.   Normal left ventricular size, ejection fraction, and wall motion.      LINDA SMITH      MRN:    [id number]          Gender:    F      Exam Date: 11/03/2019 09:05    11/2/2019 5:41 PM     HISTORY/REASON FOR EXAM:  Chest Pain.        TECHNIQUE/EXAM DESCRIPTION AND NUMBER OF VIEWS:  Single portable view of the chest.     COMPARISON: 1/27/2011     FINDINGS:  Single portable view of the chest demonstrates a stable cardiac silhouette and mediastinal contours.     No discrete opacity, pleural fluid, or pneumothorax.     No suspicious bony lesions.     IMPRESSION:     No acute cardiopulmonary findings.    LABORATORY  Lab Results   Component Value Date    SODIUM 140 11/04/2019    POTASSIUM 3.8 11/04/2019    CHLORIDE 105 11/04/2019    CO2 26 11/04/2019    GLUCOSE 124 (H) 11/04/2019    BUN 21 11/04/2019    CREATININE 0.65 11/04/2019    CREATININE 0.7 03/28/2008        Lab Results   Component Value Date    WBC 9.1 11/04/2019    HEMOGLOBIN 14.0 11/04/2019    HEMATOCRIT 42.4 11/04/2019    PLATELETCT 32 (LL) 11/04/2019        Total time of the discharge process exceeds 45 minutes.

## 2019-11-04 NOTE — PROGRESS NOTES
Assumed care of pt, beside report received from NENA Luke. Updated on POC, call light within reach all fall precautions within place. Bed locked and lowered. Pt instructed to call for assistance before getting up. All questions answered, no other needs at this time.

## 2019-11-04 NOTE — PROGRESS NOTES
Lab called with critical lab result of Platelets of 32, Hospitalitis paged, order to hold any aspirin or heparin products, will update day team and continue to monitor.

## 2019-11-04 NOTE — CARE PLAN
Problem: Communication  Goal: The ability to communicate needs accurately and effectively will improve  Outcome: PROGRESSING AS EXPECTED  Intervention: [place] patient and significant other/support system to call light to alert staff of needs  Flowsheets (Taken 11/3/2019 1946)  Oriented to:: All of the Following : Location of Bathroom, Visiting Policy, Unit Routine, Call Light and Bedside Controls, Bedside Rail Policy, Smoking Policy, Rights and Responsibilities, Bedside Report, and Patient Education Notebook  Note:   Pt educated on POC and medications. Pt verbalized understanding.      Problem: Safety  Goal: Will remain free from falls  Outcome: PROGRESSING AS EXPECTED  Intervention: Assess risk factors for falls  Flowsheets  Taken 11/3/2019 0745 by Marly Overton RKayceN.  Pt Calls for Assistance: Yes  Taken 11/3/2019 1946 by Willam Jimenez RKayceN.  Fall Risk: Risk to Fall -  0 - 1 point  History of fall: 0  Mobility Status Assessment: 0-Ambulates & Transfers Independently. No Assistance Required  Risk for Injury-Any positive answers results in the pt being at high risk for fall related injury: Not Applicable  Note:   Pt bedside table and call-light are within reach, bed in lowest position and locked. Treaded socks on and pt has been educated on call light use and asked to call before getting up.

## 2019-11-05 LAB — ALDOST SERPL-MCNC: 4 NG/DL

## 2019-11-05 NOTE — PROGRESS NOTES
Pt discharged to home with . Pt escorted down by  walking. Discharge teaching performed. Questions answered as needed. Discharge paperwork, prescriptions, and belongings with pt.    Patient encouraged to refrain from continuing diet medications, salt substitiutes, and K goods and encouraged [facility] with [facility].

## 2019-11-06 LAB — RENIN PLAS-CCNC: 0.6 NG/ML/HR

## 2022-04-08 ENCOUNTER — APPOINTMENT (OUTPATIENT)
Dept: RADIOLOGY | Facility: MEDICAL CENTER | Age: 62
End: 2022-04-08
Attending: PHYSICIAN ASSISTANT
Payer: COMMERCIAL

## 2022-04-09 ENCOUNTER — HOSPITAL ENCOUNTER (OUTPATIENT)
Dept: RADIOLOGY | Facility: MEDICAL CENTER | Age: 62
End: 2022-04-09
Attending: PHYSICIAN ASSISTANT
Payer: COMMERCIAL

## 2022-04-09 DIAGNOSIS — M54.6 PAIN IN THORACIC SPINE: ICD-10-CM

## 2022-04-09 PROCEDURE — 72146 MRI CHEST SPINE W/O DYE: CPT

## 2022-04-09 PROCEDURE — 72148 MRI LUMBAR SPINE W/O DYE: CPT

## 2022-06-15 ENCOUNTER — HOSPITAL ENCOUNTER (INPATIENT)
Facility: MEDICAL CENTER | Age: 62
LOS: 1 days | DRG: 177 | End: 2022-06-16
Attending: EMERGENCY MEDICINE | Admitting: INTERNAL MEDICINE
Payer: COMMERCIAL

## 2022-06-15 ENCOUNTER — APPOINTMENT (OUTPATIENT)
Dept: RADIOLOGY | Facility: MEDICAL CENTER | Age: 62
DRG: 177 | End: 2022-06-15
Attending: EMERGENCY MEDICINE
Payer: COMMERCIAL

## 2022-06-15 DIAGNOSIS — J96.01 ACUTE HYPOXEMIC RESPIRATORY FAILURE DUE TO COVID-19 (HCC): ICD-10-CM

## 2022-06-15 DIAGNOSIS — R09.02 HYPOXIA: ICD-10-CM

## 2022-06-15 DIAGNOSIS — E66.01 OBESITIES, MORBID (HCC): ICD-10-CM

## 2022-06-15 DIAGNOSIS — R06.02 SHORTNESS OF BREATH: ICD-10-CM

## 2022-06-15 DIAGNOSIS — J96.01 ACUTE HYPOXEMIC RESPIRATORY FAILURE (HCC): ICD-10-CM

## 2022-06-15 DIAGNOSIS — U07.1 ACUTE HYPOXEMIC RESPIRATORY FAILURE DUE TO COVID-19 (HCC): ICD-10-CM

## 2022-06-15 DIAGNOSIS — I10 PRIMARY HYPERTENSION: ICD-10-CM

## 2022-06-15 LAB
25(OH)D3 SERPL-MCNC: 12 NG/ML (ref 30–100)
ALBUMIN SERPL BCP-MCNC: 3.9 G/DL (ref 3.2–4.9)
ALBUMIN/GLOB SERPL: 1.3 G/DL
ALP SERPL-CCNC: 160 U/L (ref 30–99)
ALT SERPL-CCNC: 41 U/L (ref 2–50)
ANION GAP SERPL CALC-SCNC: 11 MMOL/L (ref 7–16)
AST SERPL-CCNC: 32 U/L (ref 12–45)
BASOPHILS # BLD AUTO: 0.5 % (ref 0–1.8)
BASOPHILS # BLD: 0.05 K/UL (ref 0–0.12)
BILIRUB SERPL-MCNC: 0.3 MG/DL (ref 0.1–1.5)
BUN SERPL-MCNC: 17 MG/DL (ref 8–22)
CALCIUM SERPL-MCNC: 9.4 MG/DL (ref 8.5–10.5)
CHLORIDE SERPL-SCNC: 95 MMOL/L (ref 96–112)
CO2 SERPL-SCNC: 28 MMOL/L (ref 20–33)
CREAT SERPL-MCNC: 0.58 MG/DL (ref 0.5–1.4)
CRP SERPL HS-MCNC: 2.77 MG/DL (ref 0–0.75)
EOSINOPHIL # BLD AUTO: 0.36 K/UL (ref 0–0.51)
EOSINOPHIL NFR BLD: 3.7 % (ref 0–6.9)
ERYTHROCYTE [DISTWIDTH] IN BLOOD BY AUTOMATED COUNT: 56.1 FL (ref 35.9–50)
ERYTHROCYTE [SEDIMENTATION RATE] IN BLOOD BY WESTERGREN METHOD: 34 MM/HOUR (ref 0–25)
FLUAV RNA SPEC QL NAA+PROBE: NEGATIVE
FLUBV RNA SPEC QL NAA+PROBE: NEGATIVE
FOLATE SERPL-MCNC: 19.4 NG/ML
GFR SERPLBLD CREATININE-BSD FMLA CKD-EPI: 103 ML/MIN/1.73 M 2
GLOBULIN SER CALC-MCNC: 3 G/DL (ref 1.9–3.5)
GLUCOSE SERPL-MCNC: 80 MG/DL (ref 65–99)
HCT VFR BLD AUTO: 40.2 % (ref 37–47)
HGB BLD-MCNC: 13.1 G/DL (ref 12–16)
IMM GRANULOCYTES # BLD AUTO: 0.16 K/UL (ref 0–0.11)
IMM GRANULOCYTES NFR BLD AUTO: 1.6 % (ref 0–0.9)
LACTATE BLD-SCNC: 1.2 MMOL/L (ref 0.5–2)
LYMPHOCYTES # BLD AUTO: 2.02 K/UL (ref 1–4.8)
LYMPHOCYTES NFR BLD: 20.7 % (ref 22–41)
MCH RBC QN AUTO: 35.6 PG (ref 27–33)
MCHC RBC AUTO-ENTMCNC: 32.6 G/DL (ref 33.6–35)
MCV RBC AUTO: 109.2 FL (ref 81.4–97.8)
MONOCYTES # BLD AUTO: 0.6 K/UL (ref 0–0.85)
MONOCYTES NFR BLD AUTO: 6.1 % (ref 0–13.4)
NEUTROPHILS # BLD AUTO: 6.59 K/UL (ref 2–7.15)
NEUTROPHILS NFR BLD: 67.4 % (ref 44–72)
NRBC # BLD AUTO: 0.05 K/UL
NRBC BLD-RTO: 0.5 /100 WBC
NT-PROBNP SERPL IA-MCNC: 531 PG/ML (ref 0–125)
PLATELET # BLD AUTO: 261 K/UL (ref 164–446)
PMV BLD AUTO: 8.7 FL (ref 9–12.9)
POTASSIUM SERPL-SCNC: 4.7 MMOL/L (ref 3.6–5.5)
PROCALCITONIN SERPL-MCNC: 0.07 NG/ML
PROT SERPL-MCNC: 6.9 G/DL (ref 6–8.2)
RBC # BLD AUTO: 3.68 M/UL (ref 4.2–5.4)
RSV RNA SPEC QL NAA+PROBE: NEGATIVE
SARS-COV-2 RNA RESP QL NAA+PROBE: DETECTED
SODIUM SERPL-SCNC: 134 MMOL/L (ref 135–145)
SPECIMEN SOURCE: ABNORMAL
TROPONIN T SERPL-MCNC: 18 NG/L (ref 6–19)
VIT B12 SERPL-MCNC: 683 PG/ML (ref 211–911)
WBC # BLD AUTO: 9.8 K/UL (ref 4.8–10.8)

## 2022-06-15 PROCEDURE — A9270 NON-COVERED ITEM OR SERVICE: HCPCS | Performed by: STUDENT IN AN ORGANIZED HEALTH CARE EDUCATION/TRAINING PROGRAM

## 2022-06-15 PROCEDURE — 80053 COMPREHEN METABOLIC PANEL: CPT

## 2022-06-15 PROCEDURE — 96374 THER/PROPH/DIAG INJ IV PUSH: CPT

## 2022-06-15 PROCEDURE — 36415 COLL VENOUS BLD VENIPUNCTURE: CPT

## 2022-06-15 PROCEDURE — 700111 HCHG RX REV CODE 636 W/ 250 OVERRIDE (IP): Performed by: STUDENT IN AN ORGANIZED HEALTH CARE EDUCATION/TRAINING PROGRAM

## 2022-06-15 PROCEDURE — 87040 BLOOD CULTURE FOR BACTERIA: CPT

## 2022-06-15 PROCEDURE — 0241U HCHG SARS-COV-2 COVID-19 NFCT DS RESP RNA 4 TRGT MIC: CPT

## 2022-06-15 PROCEDURE — 99223 1ST HOSP IP/OBS HIGH 75: CPT | Mod: GC | Performed by: INTERNAL MEDICINE

## 2022-06-15 PROCEDURE — 85025 COMPLETE CBC W/AUTO DIFF WBC: CPT

## 2022-06-15 PROCEDURE — 82607 VITAMIN B-12: CPT

## 2022-06-15 PROCEDURE — 770020 HCHG ROOM/CARE - TELE (206)

## 2022-06-15 PROCEDURE — 84484 ASSAY OF TROPONIN QUANT: CPT

## 2022-06-15 PROCEDURE — 84145 PROCALCITONIN (PCT): CPT

## 2022-06-15 PROCEDURE — 83880 ASSAY OF NATRIURETIC PEPTIDE: CPT

## 2022-06-15 PROCEDURE — 82746 ASSAY OF FOLIC ACID SERUM: CPT

## 2022-06-15 PROCEDURE — 99285 EMERGENCY DEPT VISIT HI MDM: CPT

## 2022-06-15 PROCEDURE — 700102 HCHG RX REV CODE 250 W/ 637 OVERRIDE(OP): Performed by: STUDENT IN AN ORGANIZED HEALTH CARE EDUCATION/TRAINING PROGRAM

## 2022-06-15 PROCEDURE — 83605 ASSAY OF LACTIC ACID: CPT

## 2022-06-15 PROCEDURE — 85652 RBC SED RATE AUTOMATED: CPT

## 2022-06-15 PROCEDURE — C9803 HOPD COVID-19 SPEC COLLECT: HCPCS | Performed by: EMERGENCY MEDICINE

## 2022-06-15 PROCEDURE — 82306 VITAMIN D 25 HYDROXY: CPT

## 2022-06-15 PROCEDURE — 86140 C-REACTIVE PROTEIN: CPT

## 2022-06-15 PROCEDURE — 71045 X-RAY EXAM CHEST 1 VIEW: CPT

## 2022-06-15 RX ORDER — BISACODYL 10 MG
10 SUPPOSITORY, RECTAL RECTAL
Status: DISCONTINUED | OUTPATIENT
Start: 2022-06-15 | End: 2022-06-16 | Stop reason: HOSPADM

## 2022-06-15 RX ORDER — POLYETHYLENE GLYCOL 3350 17 G/17G
1 POWDER, FOR SOLUTION ORAL
Status: DISCONTINUED | OUTPATIENT
Start: 2022-06-15 | End: 2022-06-15

## 2022-06-15 RX ORDER — HYDRALAZINE HYDROCHLORIDE 20 MG/ML
10 INJECTION INTRAMUSCULAR; INTRAVENOUS EVERY 4 HOURS PRN
Status: DISCONTINUED | OUTPATIENT
Start: 2022-06-15 | End: 2022-06-16 | Stop reason: HOSPADM

## 2022-06-15 RX ORDER — GABAPENTIN 300 MG/1
600 CAPSULE ORAL 2 TIMES DAILY
Status: DISCONTINUED | OUTPATIENT
Start: 2022-06-15 | End: 2022-06-16 | Stop reason: HOSPADM

## 2022-06-15 RX ORDER — PREDNISOLONE ACETATE 10 MG/ML
1 SUSPENSION/ DROPS OPHTHALMIC 2 TIMES DAILY
COMMUNITY
End: 2022-12-19

## 2022-06-15 RX ORDER — AMOXICILLIN 250 MG
2 CAPSULE ORAL 2 TIMES DAILY PRN
Status: DISCONTINUED | OUTPATIENT
Start: 2022-06-15 | End: 2022-06-16 | Stop reason: HOSPADM

## 2022-06-15 RX ORDER — FAMOTIDINE 20 MG/1
20 TABLET, FILM COATED ORAL 2 TIMES DAILY
Status: DISCONTINUED | OUTPATIENT
Start: 2022-06-16 | End: 2022-06-16 | Stop reason: HOSPADM

## 2022-06-15 RX ORDER — HYDROCODONE BITARTRATE AND ACETAMINOPHEN 10; 325 MG/1; MG/1
1 TABLET ORAL EVERY 4 HOURS PRN
Status: SHIPPED | COMMUNITY
End: 2022-06-15

## 2022-06-15 RX ORDER — POLYETHYLENE GLYCOL 3350 17 G/17G
1 POWDER, FOR SOLUTION ORAL
Status: DISCONTINUED | OUTPATIENT
Start: 2022-06-15 | End: 2022-06-16 | Stop reason: HOSPADM

## 2022-06-15 RX ORDER — METHOCARBAMOL 750 MG/1
750-1500 TABLET, FILM COATED ORAL 3 TIMES DAILY PRN
COMMUNITY
End: 2022-12-19

## 2022-06-15 RX ORDER — ACETAMINOPHEN 325 MG/1
650 TABLET ORAL EVERY 6 HOURS PRN
Status: DISCONTINUED | OUTPATIENT
Start: 2022-06-15 | End: 2022-06-16 | Stop reason: HOSPADM

## 2022-06-15 RX ORDER — METHYLPREDNISOLONE 4 MG/1
4 TABLET ORAL DAILY
Status: ON HOLD | COMMUNITY
Start: 2022-06-06 | End: 2022-06-16

## 2022-06-15 RX ORDER — HYDROCODONE BITARTRATE AND ACETAMINOPHEN 10; 325 MG/1; MG/1
1 TABLET ORAL EVERY 4 HOURS PRN
COMMUNITY

## 2022-06-15 RX ORDER — METHOCARBAMOL 750 MG/1
750 TABLET, FILM COATED ORAL 3 TIMES DAILY PRN
Status: DISCONTINUED | OUTPATIENT
Start: 2022-06-15 | End: 2022-06-16 | Stop reason: HOSPADM

## 2022-06-15 RX ORDER — FUROSEMIDE 10 MG/ML
20 INJECTION INTRAMUSCULAR; INTRAVENOUS ONCE
Status: COMPLETED | OUTPATIENT
Start: 2022-06-15 | End: 2022-06-15

## 2022-06-15 RX ORDER — OMEPRAZOLE 20 MG/1
40 CAPSULE, DELAYED RELEASE ORAL DAILY
Status: DISCONTINUED | OUTPATIENT
Start: 2022-06-15 | End: 2022-06-15

## 2022-06-15 RX ORDER — DEXAMETHASONE 6 MG/1
6 TABLET ORAL DAILY
Status: DISCONTINUED | OUTPATIENT
Start: 2022-06-15 | End: 2022-06-16

## 2022-06-15 RX ORDER — BISACODYL 10 MG
10 SUPPOSITORY, RECTAL RECTAL
Status: DISCONTINUED | OUTPATIENT
Start: 2022-06-15 | End: 2022-06-15

## 2022-06-15 RX ORDER — AMOXICILLIN 250 MG
2 CAPSULE ORAL 2 TIMES DAILY
Status: DISCONTINUED | OUTPATIENT
Start: 2022-06-15 | End: 2022-06-15

## 2022-06-15 RX ORDER — HYDROCODONE BITARTRATE AND ACETAMINOPHEN 10; 325 MG/1; MG/1
1 TABLET ORAL EVERY 4 HOURS PRN
Status: DISCONTINUED | OUTPATIENT
Start: 2022-06-15 | End: 2022-06-16 | Stop reason: HOSPADM

## 2022-06-15 RX ORDER — IPRATROPIUM BROMIDE AND ALBUTEROL SULFATE 2.5; .5 MG/3ML; MG/3ML
3 SOLUTION RESPIRATORY (INHALATION)
Status: DISCONTINUED | OUTPATIENT
Start: 2022-06-15 | End: 2022-06-16 | Stop reason: HOSPADM

## 2022-06-15 RX ADMIN — FUROSEMIDE 20 MG: 10 INJECTION, SOLUTION INTRAMUSCULAR; INTRAVENOUS at 17:38

## 2022-06-15 RX ADMIN — DEXAMETHASONE 6 MG: 4 TABLET ORAL at 16:41

## 2022-06-15 RX ADMIN — OMEPRAZOLE 40 MG: 20 CAPSULE, DELAYED RELEASE ORAL at 16:41

## 2022-06-15 RX ADMIN — GABAPENTIN 600 MG: 300 CAPSULE ORAL at 17:52

## 2022-06-15 RX ADMIN — HYDROCODONE BITARTRATE AND ACETAMINOPHEN 1 TABLET: 10; 325 TABLET ORAL at 16:45

## 2022-06-15 RX ADMIN — METHOCARBAMOL 750 MG: 750 TABLET ORAL at 21:05

## 2022-06-15 RX ADMIN — RIVAROXABAN 10 MG: 10 TABLET, FILM COATED ORAL at 17:38

## 2022-06-15 ASSESSMENT — ENCOUNTER SYMPTOMS
SPUTUM PRODUCTION: 0
SORE THROAT: 0
WEAKNESS: 0
FLANK PAIN: 0
PALPITATIONS: 0
FEVER: 0
DEPRESSION: 0
CONSTIPATION: 0
SHORTNESS OF BREATH: 0
EYE REDNESS: 0
BLOOD IN STOOL: 0
PND: 0
BRUISES/BLEEDS EASILY: 0
MYALGIAS: 0
HEADACHES: 0
ORTHOPNEA: 0
FOCAL WEAKNESS: 0
WHEEZING: 1
CHILLS: 0
DIARRHEA: 0
BLURRED VISION: 0
LOSS OF CONSCIOUSNESS: 0
BACK PAIN: 1
VOMITING: 0
COUGH: 0
TREMORS: 0
ABDOMINAL PAIN: 0
NAUSEA: 0
NERVOUS/ANXIOUS: 0

## 2022-06-15 ASSESSMENT — PAIN DESCRIPTION - PAIN TYPE: TYPE: CHRONIC PAIN

## 2022-06-15 NOTE — ED NOTES
Med rec completed per patient and spouse at bedside, and patient's pharmacies [email or web address] on [address] ([phone]) and [facility] on [address] ([phone]). Spouse translated for patient.  Allergies reviewed with spouse. NKDA.  No outpatient antibiotics in the last 30 days.    Per [facility], patient is prescribed gabapentin 600 mg with directions to take 1 tablet 3 times per day; per spouse, patient takes 2 tablets (1,200 mg) at bedtime only.

## 2022-06-15 NOTE — ED TRIAGE NOTES
"63 y/o female to triage by w/c with    Chief Complaint   Patient presents with   • Shortness of Breath     Started yesterday, was seen by [facility].   \"65% oxygen sat on room air, corrects to 97% on 4L NC\"    • Rib Pain     X 3-4 weeks      Hx of Lumbar Kyphoplasty   JOSE Mobley PA-C [phone]  Pt had xray done today  "

## 2022-06-15 NOTE — ED NOTES
Patient ambulated to restroom without oxygen. Upon getting back to room she was 70% on RA, placed back on 3L NC and patient now at 97%. Admitting MD at bedside

## 2022-06-15 NOTE — ED PROVIDER NOTES
"ED Provider Note    Scribed for Manuel Coronel M.D. by Tessa Patino. 6/15/2022  12:15 PM    Primary care provider: No primary care provider noted  Means of arrival: Walk-in  History obtained from: Patient  History limited by: None    CHIEF COMPLAINT  Chief Complaint   Patient presents with   • Shortness of Breath     Started yesterday, was seen by [facility].   \"65% oxygen sat on room air, corrects to 97% on 4L NC\"    • Rib Pain     X 3-4 weeks        HPI  Kayla Hernández is a 62 y.o. female who presents to the Emergency Department for shortness of breath onset 3 days ago. The patient states she was seen by [facility] to follow up after a recent kyphoplasty procedure performed last month when they incidentally detected hypoxia. She is additionally experiencing cough, rib pain, and body aches. She denies fevers, chills, nausea, nasal congestion, and leg swelling. She states she is fully vaccinated against COVID-19.     REVIEW OF SYSTEMS  Pertinent negatives include no fevers, chills, nausea, nasal congestion, and leg swelling. As above, all other systems reviewed and are negative.   See HPI for further details.     PAST MEDICAL HISTORY   Patient has history of back pain.    SURGICAL HISTORY   has a past surgical history that includes c-sec only,prev c-sec and orif, ankle (1/27/2011).    SOCIAL HISTORY  Social History     Tobacco Use   • Smoking status: Former Smoker   • Smokeless tobacco: Never Used   Substance Use Topics   • Alcohol use: Yes     Alcohol/week: 1.2 oz     Types: 2 Cans of beer per week     Comment: occas   • Drug use: No      Social History     Substance and Sexual Activity   Drug Use No       FAMILY HISTORY  Family History   Problem Relation Age of Onset   • Hypertension Mother    • Hypertension Father        CURRENT MEDICATIONS  Current Outpatient Medications   Medication Instructions   • atorvastatin (LIPITOR) 40 mg, Oral, EVERY EVENING   • cyanocobalamin (VITAMIN B12) " 1,000 mcg, Oral, DAILY   • gabapentin (NEURONTIN) 300 mg, Oral, 3 TIMES DAILY       ALLERGIES  No Known Allergies    PHYSICAL EXAM  VITAL SIGNS: BP (!) 146/98   Pulse 82   Temp 36.8 °C (98.2 °F) (Temporal)   Resp 16   Wt 68 kg (149 lb 14.6 oz)   SpO2 92%   BMI 28.33 kg/m²   Constitutional: Well developed, Well nourished, No acute distress, Non-toxic appearance.   HENT: Normocephalic, Atraumatic, Bilateral external ears normal, Oropharynx is clear mucous membranes are moist. No oral exudates or nasal discharge.   Eyes: Pupils are equal round and reactive, EOMI, Conjunctiva normal, No discharge.   Neck: Normal range of motion, No tenderness, Supple, No stridor. No meningismus.  Lymphatic: No lymphadenopathy noted.   Cardiovascular: Regular rate and rhythm without murmur rub or gallop.  Thorax & Lungs: Mild respiratory distress. Bilateral rhonchi. No wheezes or rales. There is no chest wall tenderness.   Abdomen: Soft non-tender non-distended. There is no rebound or guarding. No organomegaly is appreciated. Bowel sounds are normal.  Skin: Normal without rash.   Back: No CVA or spinal tenderness.   Extremities: Intact distal pulses, No edema, No tenderness, No cyanosis, No clubbing. Capillary refill is less than 2 seconds.  Musculoskeletal: Good range of motion in all major joints. No tenderness to palpation or major deformities noted.   Neurologic: Alert & oriented x 3, Normal motor function, Normal sensory function, No focal deficits noted. Reflexes are normal.  Psychiatric: Affect normal, Judgment normal, Mood normal. There is no suicidal ideation or patient reported hallucinations.     DIAGNOSTIC STUDIES / PROCEDURES    LABS  Labs Reviewed   CBC WITH DIFFERENTIAL - Abnormal; Notable for the following components:       Result Value    RBC 3.68 (*)     .2 (*)     MCH 35.6 (*)     MCHC 32.6 (*)     RDW 56.1 (*)     MPV 8.7 (*)     Lymphocytes 20.70 (*)     Immature Granulocytes 1.60 (*)     Immature  "Granulocytes (abs) 0.16 (*)     All other components within normal limits   COMP METABOLIC PANEL - Abnormal; Notable for the following components:    Sodium 134 (*)     Chloride 95 (*)     Alkaline Phosphatase 160 (*)     All other components within normal limits   LACTIC ACID   COV-2, FLU A/B, AND RSV BY PCR (CEPHEID)   ESTIMATED GFR   LACTIC ACID   LACTIC ACID   BLOOD CULTURE    Narrative:     Per Hospital Policy: Only change Specimen Src: to \"Line\" if  specified by physician order.   BLOOD CULTURE      All labs reviewed by me.    RADIOLOGY  DX-CHEST-PORTABLE (1 VIEW)   Final Result      1.  No acute cardiopulmonary abnormality identified.      2.  Enlarged cardiac silhouette           The radiologist's interpretation of all radiological studies have been reviewed by me.    COURSE & MEDICAL DECISION MAKING  Nursing notes, VS, PMSFHx reviewed in chart.    12:15 PM - Patient seen and examined at bedside. Ordered for labs and DX-Chest to evaluate. I informed her she likely has pneumonia and discussed likely plan for hospitalization at this time for persistent hypoxia. Patient verbalizes understanding and agreement to this plan of care.     We sent off COVID and influenza testing which is still pending.    Laboratory evaluation reveals no leukocytosis, shift, anemia, significant electrolyte derangements or acidosis.  Alkaline phosphatase is only slightly high at 160 and lactate is reasonable at 1.2 and I doubt significant sepsis.    Chest x-ray shows an enlarged cardiac silhouette but no definitive lobar pneumonia or mass.    Again I have a high suspicion of COVID induced hypoxia and much less suspicious for pulmonary embolism or lobar pneumonia.  Also moderately suspicious for influenza    Holding off on antibiotics until results of COVID influenza test are known    Spoke with [facility] service at approximately 1:50 PM and they will admit the patient to the hospital and treat accordingly if COVID-positive versus " influenza positive or if both tests are negative    DISPOSITION:  Patient will be hospitalized in guarded condition.     FINAL IMPRESSION  1. Hypoxia    2. Shortness of breath    3. Obesities, morbid (HCC)    4. Primary hypertension          Tessa CALVIN (Scribe), am scribing for, and in the presence of, Manuel Coronel M.D..    Electronically signed by: Tessa Patino (Scribe), 6/15/2022    IManuel M.D. personally performed the services described in this documentation, as scribed by Tessa Patino in my presence, and it is both accurate and complete.    The note accurately reflects work and decisions made by me.  Manuel Coronel M.D.  6/15/2022  1:56 PM

## 2022-06-15 NOTE — H&P
History & Physical Note    Date of Admission: 6/15/2022  Admission Status: Inpatient  [facility] Team: [facility]  Attending: Ryan Mcclure M.D.   Senior Resident: Dr. Marlene Chandler MD  Intern: Dr. Len Carlson DO  Contact Number: [phone]    Chief Complaint: Rib pain and hypoxia    History of Present Illness (HPI):  Ms. Hernández is a 62-year-old female with a past medical history of CVA in 2006 with left facial numbness, sciatica, chronic back pain s/p lumbar fusion in 2006, on chronic opiates, distant smoking history, who presents today with acute hypoxia as well as bilateral rib pain.    Patient is Sinhala-speaking with  at bedside interpreting.  States that about 3 days ago patient started developing bilateral lower rib pain that she states is worse when she takes a deep breath in.  Notes that she had a kyphoplasty about 1 month ago and initially thought that the pain is secondary to return of her back pain.  Patient states that her back pain has been ongoing for multiple years and she has had lumbar fusion in 2008.  Patient denies any other symptoms besides her chest wall pain.  States that the pain stays in her lower ribs and does not radiate anywhere.  He has not tried any over-the-counter medications or topical medications to help with the pain.  States that she has never had similar issues.  Yesterday, while the patient was asleep, her  noticed that she was starting to turn purple and seem like she was having a hard time breathing.  Patient states that she does not recall this and feels fine overall.  Patient denies any recent illness, notes that they were recently at a party on 6/4 where an attendant was found to be COVID-positive and symptomatic. Because of the change in color as well as rib pain, patient was taken to the urgent care earlier today, was found to be hypoxic down to around 65% and was placed on 4 L nasal cannula and [facility].    In the ED, patient was ambulated and desaturated down to about 70% before being placed on 2 L of oxygen with quick recovery.  Patient was found to be hypertensive with a blood pressure up to 179/85, nontachycardic nontachypneic, afebrile, and oxygenating above 90% on 2 L nasal cannula.  CBC is remarkable for an elevated MCV of 109.2, elevated RDW of 56.1, CMP without significant abnormalities, lactate normal, and chest x-ray which showed an enlarged cardiac silhouette without any acute abnormalities.  COVID test was found to be positive.  Therefore, patient will be admitted to telemetry for close monitoring for acute hypoxic respiratory failure likely secondary to COVID infection.    Review of Systems:  Review of Systems   Constitutional: Positive for malaise/fatigue. Negative for chills and fever.   HENT: Negative for congestion and sore throat.    Eyes: Negative for blurred vision and redness.   Respiratory: Positive for wheezing. Negative for cough, sputum production and shortness of breath.    Cardiovascular: Positive for chest pain (bilateral chest walls). Negative for palpitations, orthopnea, leg swelling and PND.   Gastrointestinal: Negative for abdominal pain, blood in stool, constipation, diarrhea, melena, nausea and vomiting.   Genitourinary: Negative for dysuria, flank pain, frequency and urgency.   Musculoskeletal: Positive for back pain. Negative for myalgias.   Skin: Negative for itching and rash.   Neurological: Negative for tremors, focal weakness, loss of consciousness, weakness and headaches.   Endo/Heme/Allergies: Does not bruise/bleed easily.   Psychiatric/Behavioral: Negative for depression. The patient is not nervous/anxious.      Past Medical History:   Past Medical History was reviewed with patient.  She has a history of chronic back pain on chronic opiate medications, previous stroke in 2006 with residual facial numbness.    Past Surgical History: Past Surgical History was  reviewed with patient.   has a past surgical history that includes pr c-sec only,prev c-sec and orif, ankle (1/27/2011).  Patient also notes that she had a lumbar fusion in 2008, and kyphoplasty in May 2022.    Medications: Medications have been reviewed with patient.  Prior to Admission Medications   Prescriptions Last Dose Informant Patient Reported? Taking?   BIOTIN PO 6/14/2022 at AM Significant Other Yes Yes   Sig: Take 1 Tablet by mouth every day.   HYDROcodone/acetaminophen (NORCO)  MG Tab 6/15/2022 at 1000 Patient's Home Pharmacy Yes Yes   Sig: Take 1 Tablet by mouth every four hours as needed for Severe Pain.   gabapentin (NEURONTIN) 600 MG tablet 6/14/2022 at 2300 Significant Other Yes No   Sig: Take 1,200 mg by mouth at bedtime. 2 tablets = 1,200 mg.   methocarbamol (ROBAXIN) 750 MG Tab 6/14/2022 at PM Patient's Home Pharmacy Yes Yes   Sig: Take 750-1,500 mg by mouth 3 times a day as needed (Muscle Spasms). 1 to 2 tablets = 750 to 1,500 mg.   methylPREDNISolone (MEDROL DOSEPAK) 4 MG Tablet Therapy Pack 6/11/2022 at STOPPED Patient's Home Pharmacy Yes Yes   Sig: Take 4 mg by mouth every day. Prescribed 6/6/2022. Follow schedule on package instructions.   prednisoLONE acetate (PRED FORTE) 1 % Suspension 6/15/2022 at AM Significant Other Yes Yes   Sig: Administer 1 Drop into the left eye 2 times a day.      Facility-Administered Medications: None        Allergies: Allergies have been reviewed with patient.  No Known Allergies    Family History:  family history includes Hypertension in her father and mother.     Social History:   Tobacco: 20+ pack year smoking history, quit in 2006  Alcohol: Occasional drinker, drinks 2-3 beers per week  Recreational drugs (illegal and prescription): Utilizes edibles for sleep.  Employment: Disabled due to chronic back pain.  Activity Level: Low to moderate.  Living situation: Currently lives with her  in an apartment.  Recent travel: Denies any recent  travel.  Primary Care Provider: reviewed Pcp Pt States None  Other (stressors, spirituality, exposures): No significant SDOH to impact care    Vitals:  Temp:  [36.8 °C (98.2 °F)-36.9 °C (98.5 °F)] 36.9 °C (98.5 °F)  Pulse:  [65-82] 76  Resp:  [16-26] 18  BP: (146-179)/(85-98) 179/85  SpO2:  [71 %-98 %] 97 %    Physical Exam  Constitutional:       General: She is not in acute distress.     Appearance: She is obese. She is not diaphoretic.   HENT:      Head: Normocephalic and atraumatic.      Right Ear: Tympanic membrane normal.      Left Ear: Tympanic membrane normal.      Nose: Nose normal.      Mouth/Throat:      Mouth: Mucous membranes are moist.      Pharynx: Oropharynx is clear.   Eyes:      General: No scleral icterus.     Extraocular Movements: Extraocular movements intact.      Pupils: Pupils are equal, round, and reactive to light.   Cardiovascular:      Rate and Rhythm: Normal rate and regular rhythm.      Pulses: Normal pulses.      Heart sounds: Normal heart sounds. No murmur heard.     Comments: Tenderness to palpation on the lateral lower ribs on 9 and 10 bilaterally.  Pulmonary:      Effort: Pulmonary effort is normal. No respiratory distress.      Breath sounds: Normal breath sounds. No wheezing or rhonchi.   Abdominal:      General: Abdomen is flat. Bowel sounds are normal. There is distension (Due to body habitus).      Palpations: Abdomen is soft.      Tenderness: There is no abdominal tenderness. There is no right CVA tenderness or left CVA tenderness.      Hernia: No hernia is present.   Musculoskeletal:         General: No swelling. Normal range of motion.      Cervical back: Normal range of motion and neck supple.      Right lower leg: Edema (Trace nonpitting) present.      Left lower leg: Edema (Trace nonpitting) present.   Skin:     General: Skin is warm and dry.      Capillary Refill: Capillary refill takes less than 2 seconds.      Coloration: Skin is not jaundiced.      Findings: No  erythema or rash.   Neurological:      General: No focal deficit present.      Mental Status: She is alert and oriented to person, place, and time. Mental status is at baseline.      Cranial Nerves: No cranial nerve deficit.      Motor: No weakness.      Deep Tendon Reflexes: Reflexes normal.   Psychiatric:         Mood and Affect: Mood normal.         Behavior: Behavior normal.     Labs:   Lab Results   Component Value Date/Time    WBC 9.8 06/15/2022 12:48 PM    RBC 3.68 (L) 06/15/2022 12:48 PM    HEMOGLOBIN 13.1 06/15/2022 12:48 PM    HEMATOCRIT 40.2 06/15/2022 12:48 PM    .2 (H) 06/15/2022 12:48 PM    MCH 35.6 (H) 06/15/2022 12:48 PM    MCHC 32.6 (L) 06/15/2022 12:48 PM    MPV 8.7 (L) 06/15/2022 12:48 PM    NEUTSPOLYS 67.40 06/15/2022 12:48 PM    LYMPHOCYTES 20.70 (L) 06/15/2022 12:48 PM    MONOCYTES 6.10 06/15/2022 12:48 PM    EOSINOPHILS 3.70 06/15/2022 12:48 PM    BASOPHILS 0.50 06/15/2022 12:48 PM        Lab Results   Component Value Date/Time    SODIUM 134 (L) 06/15/2022 12:48 PM    POTASSIUM 4.7 06/15/2022 12:48 PM    CHLORIDE 95 (L) 06/15/2022 12:48 PM    CO2 28 06/15/2022 12:48 PM    GLUCOSE 80 06/15/2022 12:48 PM    BUN 17 06/15/2022 12:48 PM    CREATININE 0.58 06/15/2022 12:48 PM    CREATININE 0.7 03/28/2008 10:15 AM        Lab Results   Component Value Date/Time    PROTHROMBTM 11.2 01/26/2011 06:50 PM    INR 0.97 01/26/2011 06:50 PM        EKG: Pending.    Imaging:   Chest x-ray, 6/15/2022, no acute abnormalities noted.    Previous Data Review: reviewed    Assessment and Plan:   Ms. Hernández is a 62-year-old female with a past medical history of CVA in 2006 with left facial numbness, sciatica, chronic back pain s/p lumbar fusion in 2006, on chronic opiates, distant smoking history, who presents today with acute hypoxia as well as bilateral rib pain.    #Acute hypoxic respiratory failure  #COVID-19 infection  Positive test in the ED as well as asymptomatic hypoxia is concerning for active COVID  infection.  Patient is vaccinated x2 with Pfizer, has not had a booster.  Sick contact makes active infection more likely.  - Admit to telemetry.  - Continuous pulse oximetry.  - Start on Decadron 6 mg daily.  - Start omeprazole 40 mg daily.  - DuoNebs as needed every 6 hours for wheezing.  - RT protocol in place.  - Encourage incentive spirometry.  - Procalcitonin pending to rule out superimposed infection.  - EKG, troponin, BNP pending to work-up potential cardiac cause of respiratory failure.    #Hypertensive urgency  Patient blood pressure significantly elevated.  Does not have a history of hypertension.  Does not take any medications at home besides chronic pain medications.  - Once dose Lasix 20 mg IV.  - Continue to monitor blood pressure closely.  - Hydralazine 10 mg every 6 hours as needed for blood pressures sustained above systolic 180.  - Lipid panel pending.  - TSH pending.    #Chronic back pain  History of lumbar fusion and kyphoplasty.  - Hydrocodone/acetaminophen 10/325 every 4 hours as needed for severe pain.  - Methocarbamol 750 mg 3 times daily as needed for severe pain for back spasms.  - Acetaminophen 650 mg every 6 hours as needed.    #History of CVA with residual deficits  Patient with mild decrease sensation on the left side of her face.  Patient also notes some numbness in her right hand however this appears to be at baseline.  - Continue to monitor.    Code Status: Full  DVT prophylaxis:  Rivaroxaban  Diet:  Regular diet  GI: Bowel protocol in place with omeprazole for concurrent steroid use.  Disposition: To home, pending improvement of acute hypoxic respiratory failure.    Len Carlson DO, MPH  PGY-1 Internal Medicine

## 2022-06-16 ENCOUNTER — PHARMACY VISIT (OUTPATIENT)
Dept: PHARMACY | Facility: MEDICAL CENTER | Age: 62
End: 2022-06-16
Payer: COMMERCIAL

## 2022-06-16 ENCOUNTER — TELEPHONE (OUTPATIENT)
Dept: OTHER | Facility: MEDICAL CENTER | Age: 62
End: 2022-06-16
Payer: COMMERCIAL

## 2022-06-16 VITALS
HEART RATE: 85 BPM | SYSTOLIC BLOOD PRESSURE: 148 MMHG | TEMPERATURE: 98.3 F | BODY MASS INDEX: 28.33 KG/M2 | OXYGEN SATURATION: 93 % | RESPIRATION RATE: 16 BRPM | DIASTOLIC BLOOD PRESSURE: 83 MMHG | WEIGHT: 149.91 LBS

## 2022-06-16 VITALS — OXYGEN SATURATION: 95 %

## 2022-06-16 LAB
ALBUMIN SERPL BCP-MCNC: 4 G/DL (ref 3.2–4.9)
ALBUMIN/GLOB SERPL: 1.3 G/DL
ALP SERPL-CCNC: 172 U/L (ref 30–99)
ALT SERPL-CCNC: 39 U/L (ref 2–50)
ANION GAP SERPL CALC-SCNC: 9 MMOL/L (ref 7–16)
AST SERPL-CCNC: 27 U/L (ref 12–45)
BASOPHILS # BLD AUTO: 0.1 % (ref 0–1.8)
BASOPHILS # BLD: 0.01 K/UL (ref 0–0.12)
BILIRUB SERPL-MCNC: 0.2 MG/DL (ref 0.1–1.5)
BUN SERPL-MCNC: 15 MG/DL (ref 8–22)
CALCIUM SERPL-MCNC: 9.2 MG/DL (ref 8.5–10.5)
CHLORIDE SERPL-SCNC: 95 MMOL/L (ref 96–112)
CHOLEST SERPL-MCNC: 247 MG/DL (ref 100–199)
CO2 SERPL-SCNC: 31 MMOL/L (ref 20–33)
CREAT SERPL-MCNC: 0.49 MG/DL (ref 0.5–1.4)
EOSINOPHIL # BLD AUTO: 0.01 K/UL (ref 0–0.51)
EOSINOPHIL NFR BLD: 0.1 % (ref 0–6.9)
ERYTHROCYTE [DISTWIDTH] IN BLOOD BY AUTOMATED COUNT: 55.4 FL (ref 35.9–50)
GFR SERPLBLD CREATININE-BSD FMLA CKD-EPI: 107 ML/MIN/1.73 M 2
GLOBULIN SER CALC-MCNC: 3 G/DL (ref 1.9–3.5)
GLUCOSE SERPL-MCNC: 142 MG/DL (ref 65–99)
HCT VFR BLD AUTO: 42.4 % (ref 37–47)
HDLC SERPL-MCNC: 76 MG/DL
HGB BLD-MCNC: 13.6 G/DL (ref 12–16)
IMM GRANULOCYTES # BLD AUTO: 0.14 K/UL (ref 0–0.11)
IMM GRANULOCYTES NFR BLD AUTO: 2 % (ref 0–0.9)
LDLC SERPL CALC-MCNC: 159 MG/DL
LYMPHOCYTES # BLD AUTO: 0.53 K/UL (ref 1–4.8)
LYMPHOCYTES NFR BLD: 7.7 % (ref 22–41)
MAGNESIUM SERPL-MCNC: 2.6 MG/DL (ref 1.5–2.5)
MCH RBC QN AUTO: 34.4 PG (ref 27–33)
MCHC RBC AUTO-ENTMCNC: 32.1 G/DL (ref 33.6–35)
MCV RBC AUTO: 107.3 FL (ref 81.4–97.8)
MONOCYTES # BLD AUTO: 0.15 K/UL (ref 0–0.85)
MONOCYTES NFR BLD AUTO: 2.2 % (ref 0–13.4)
NEUTROPHILS # BLD AUTO: 6.04 K/UL (ref 2–7.15)
NEUTROPHILS NFR BLD: 87.9 % (ref 44–72)
NRBC # BLD AUTO: 0 K/UL
NRBC BLD-RTO: 0 /100 WBC
NT-PROBNP SERPL IA-MCNC: 327 PG/ML (ref 0–125)
PLATELET # BLD AUTO: 263 K/UL (ref 164–446)
PMV BLD AUTO: 8.8 FL (ref 9–12.9)
POTASSIUM SERPL-SCNC: 4.3 MMOL/L (ref 3.6–5.5)
PROT SERPL-MCNC: 7 G/DL (ref 6–8.2)
RBC # BLD AUTO: 3.95 M/UL (ref 4.2–5.4)
SODIUM SERPL-SCNC: 135 MMOL/L (ref 135–145)
T4 FREE SERPL-MCNC: 1.21 NG/DL (ref 0.93–1.7)
TRIGL SERPL-MCNC: 59 MG/DL (ref 0–149)
TSH SERPL DL<=0.005 MIU/L-ACNC: 0.26 UIU/ML (ref 0.38–5.33)
WBC # BLD AUTO: 6.9 K/UL (ref 4.8–10.8)

## 2022-06-16 PROCEDURE — 84443 ASSAY THYROID STIM HORMONE: CPT

## 2022-06-16 PROCEDURE — 700102 HCHG RX REV CODE 250 W/ 637 OVERRIDE(OP): Performed by: STUDENT IN AN ORGANIZED HEALTH CARE EDUCATION/TRAINING PROGRAM

## 2022-06-16 PROCEDURE — A9270 NON-COVERED ITEM OR SERVICE: HCPCS | Performed by: GENERAL PRACTICE

## 2022-06-16 PROCEDURE — 36415 COLL VENOUS BLD VENIPUNCTURE: CPT

## 2022-06-16 PROCEDURE — 99454 REM MNTR PHYSIOL PARAM 16-30: CPT

## 2022-06-16 PROCEDURE — 700102 HCHG RX REV CODE 250 W/ 637 OVERRIDE(OP): Performed by: GENERAL PRACTICE

## 2022-06-16 PROCEDURE — A9270 NON-COVERED ITEM OR SERVICE: HCPCS | Performed by: STUDENT IN AN ORGANIZED HEALTH CARE EDUCATION/TRAINING PROGRAM

## 2022-06-16 PROCEDURE — 84439 ASSAY OF FREE THYROXINE: CPT

## 2022-06-16 PROCEDURE — 80061 LIPID PANEL: CPT

## 2022-06-16 PROCEDURE — RXMED WILLOW AMBULATORY MEDICATION CHARGE: Performed by: GENERAL PRACTICE

## 2022-06-16 PROCEDURE — 83735 ASSAY OF MAGNESIUM: CPT

## 2022-06-16 PROCEDURE — 99239 HOSP IP/OBS DSCHRG MGMT >30: CPT | Performed by: GENERAL PRACTICE

## 2022-06-16 PROCEDURE — 83880 ASSAY OF NATRIURETIC PEPTIDE: CPT

## 2022-06-16 PROCEDURE — 99453 REM MNTR PHYSIOL PARAM SETUP: CPT

## 2022-06-16 PROCEDURE — 80053 COMPREHEN METABOLIC PANEL: CPT

## 2022-06-16 PROCEDURE — 85025 COMPLETE CBC W/AUTO DIFF WBC: CPT

## 2022-06-16 RX ORDER — GUAIFENESIN 600 MG/1
1200 TABLET, EXTENDED RELEASE ORAL EVERY 12 HOURS
Status: DISCONTINUED | OUTPATIENT
Start: 2022-06-16 | End: 2022-06-16 | Stop reason: HOSPADM

## 2022-06-16 RX ORDER — DEXAMETHASONE 6 MG/1
6 TABLET ORAL DAILY
Qty: 8 TABLET | Refills: 0 | Status: SHIPPED | OUTPATIENT
Start: 2022-06-17 | End: 2022-06-25

## 2022-06-16 RX ORDER — DEXAMETHASONE 6 MG/1
6 TABLET ORAL DAILY
Status: DISCONTINUED | OUTPATIENT
Start: 2022-06-17 | End: 2022-06-16 | Stop reason: HOSPADM

## 2022-06-16 RX ORDER — ALBUTEROL SULFATE 90 UG/1
2 AEROSOL, METERED RESPIRATORY (INHALATION) EVERY 6 HOURS PRN
Qty: 8.5 G | Refills: 0 | Status: SHIPPED | OUTPATIENT
Start: 2022-06-16 | End: 2022-12-19

## 2022-06-16 RX ORDER — FAMOTIDINE 20 MG/1
20 TABLET, FILM COATED ORAL 2 TIMES DAILY
Qty: 20 TABLET | Refills: 0 | Status: SHIPPED | OUTPATIENT
Start: 2022-06-16 | End: 2022-06-26

## 2022-06-16 RX ORDER — GUAIFENESIN 600 MG/1
1200 TABLET, EXTENDED RELEASE ORAL EVERY 12 HOURS
Qty: 20 TABLET | Refills: 0 | Status: SHIPPED | OUTPATIENT
Start: 2022-06-16 | End: 2022-06-21

## 2022-06-16 RX ADMIN — GUAIFENESIN 1200 MG: 600 TABLET, EXTENDED RELEASE ORAL at 07:54

## 2022-06-16 RX ADMIN — GABAPENTIN 600 MG: 300 CAPSULE ORAL at 05:11

## 2022-06-16 RX ADMIN — DEXAMETHASONE 6 MG: 4 TABLET ORAL at 05:11

## 2022-06-16 RX ADMIN — HYDROCODONE BITARTRATE AND ACETAMINOPHEN 1 TABLET: 10; 325 TABLET ORAL at 07:55

## 2022-06-16 RX ADMIN — HYDROCODONE BITARTRATE AND ACETAMINOPHEN 1 TABLET: 10; 325 TABLET ORAL at 01:49

## 2022-06-16 RX ADMIN — FAMOTIDINE 20 MG: 20 TABLET, FILM COATED ORAL at 05:11

## 2022-06-16 ASSESSMENT — LIFESTYLE VARIABLES
EVER FELT BAD OR GUILTY ABOUT YOUR DRINKING: NO
EVER HAD A DRINK FIRST THING IN THE MORNING TO STEADY YOUR NERVES TO GET RID OF A HANGOVER: NO
HAVE YOU EVER FELT YOU SHOULD CUT DOWN ON YOUR DRINKING: NO
CONSUMPTION TOTAL: POSITIVE
TOTAL SCORE: 0
AVERAGE NUMBER OF DAYS PER WEEK YOU HAVE A DRINK CONTAINING ALCOHOL: 7
ALCOHOL_USE: YES
HAVE PEOPLE ANNOYED YOU BY CRITICIZING YOUR DRINKING: NO
TOTAL SCORE: 0
HOW MANY TIMES IN THE PAST YEAR HAVE YOU HAD 5 OR MORE DRINKS IN A DAY: 0
TOTAL SCORE: 0
DOES PATIENT WANT TO STOP DRINKING: NO
ON A TYPICAL DAY WHEN YOU DRINK ALCOHOL HOW MANY DRINKS DO YOU HAVE: 2

## 2022-06-16 ASSESSMENT — COGNITIVE AND FUNCTIONAL STATUS - GENERAL
DRESSING REGULAR LOWER BODY CLOTHING: A LITTLE
STANDING UP FROM CHAIR USING ARMS: A LOT
WALKING IN HOSPITAL ROOM: A LITTLE
DRESSING REGULAR UPPER BODY CLOTHING: A LITTLE
MOBILITY SCORE: 17
SUGGESTED CMS G CODE MODIFIER MOBILITY: CK
MOVING FROM LYING ON BACK TO SITTING ON SIDE OF FLAT BED: A LITTLE
MOVING TO AND FROM BED TO CHAIR: A LITTLE
TOILETING: A LITTLE
SUGGESTED CMS G CODE MODIFIER DAILY ACTIVITY: CJ
DAILY ACTIVITIY SCORE: 20
TURNING FROM BACK TO SIDE WHILE IN FLAT BAD: A LITTLE
HELP NEEDED FOR BATHING: A LITTLE
CLIMB 3 TO 5 STEPS WITH RAILING: A LITTLE

## 2022-06-16 ASSESSMENT — COPD QUESTIONNAIRES
DO YOU EVER COUGH UP ANY MUCUS OR PHLEGM?: NO/ONLY WITH OCCASIONAL COLDS OR INFECTIONS
HAVE YOU SMOKED AT LEAST 100 CIGARETTES IN YOUR ENTIRE LIFE: YES
COPD SCREENING SCORE: 6
DURING THE PAST 4 WEEKS HOW MUCH DID YOU FEEL SHORT OF BREATH: SOME OF THE TIME

## 2022-06-16 ASSESSMENT — PATIENT HEALTH QUESTIONNAIRE - PHQ9
1. LITTLE INTEREST OR PLEASURE IN DOING THINGS: NOT AT ALL
SUM OF ALL RESPONSES TO PHQ9 QUESTIONS 1 AND 2: 0
2. FEELING DOWN, DEPRESSED, IRRITABLE, OR HOPELESS: NOT AT ALL

## 2022-06-16 ASSESSMENT — PAIN DESCRIPTION - PAIN TYPE
TYPE: SURGICAL PAIN

## 2022-06-16 NOTE — FACE TO FACE
Face to Face Supporting Documentation - Home Health    The encounter with this patient was in whole or in part the primary reason for home health admission.    Date of encounter:   Patient:                    MRN:                       YOB: 2021  Kayla Hernández  [id number]  1960     Home health to see patient for:  Skilled Nursing care for assessment, interventions & education, Home health aide, Physical Therapy evaluation and treatment and Occupational therapy evaluation and treatment    Skilled need for:  New Onset Medical Diagnosis COVID 19, debility    Skilled nursing interventions to include:  Comment: PT/OT/ HH    Homebound status evidenced by:  Need the aid of supportive devices such as crutches, canes, wheelchairs or walkers. Leaving home requires a considerable and taxing effort. There is a normal inability to leave the home.    Community Physician to provide follow up care: Pcp Pt States None     Optional Interventions? No      I certify the face to face encounter for this home health care referral meets the CMS requirements and the encounter/clinical assessment with the patient was, in whole, or in part, for the medical condition(s) listed above, which is the primary reason for home health care. Based on my clinical findings: the service(s) are medically necessary, support the need for home health care, and the homebound criteria are met.  I certify that this patient has had a face to face encounter by myself.  Maren Sarmiento D.O. - NPI: [id number]

## 2022-06-16 NOTE — DISCHARGE INSTRUCTIONS
Continúe tomando Decadron 6 mg al día key los próximos 8 días. Tomará esto después del desayuno, continuará tomando Pepcid dos veces al día key los próximos 10 días para proteger curtis estómago de los esteroides, tómelo a primera hora de la mañana antes de elizabet cualquier medicamento o comer.    Eventualmente, con el tiempo, podrá dejar de usar oxígeno, esto sucederá lentamente. Continuará monitoreando curtis oxígeno en curtis aplicación de monitoreo remoto que se programará en curtis teléfono.    Tendrá un seguimiento con curtis médico de atención primaria a través de telesalud. Pídale a curtis médico de atención primaria que vuelva a revisar brock niveles de tiroides, ya que curtis nivel de tiroides estaba bajo en 0.260, pídale que lo vuelva a revisar en aproximadamente 4 a 6 semanas. Si curtis nivel de tiroides continúa siendo bajo, tendrá que comenzar con la medicación.    Se le proporcionará un inhalador de albuterol que puede usar según sea necesario cuando sienta que le falta el aire de forma aguda.    ¡Por favor cuídense y estén an!  ---    Please continue taking Decadron 6 mg daily for the next 8 days.  You will take this after breakfast, you will continue taking Pepcid twice a day for the next 10 days to protect your stomach from the steroids, take this first thing in the morning before you take any medications or eat food.    Eventually over time you will be able to wean yourself off of oxygen, this will happen slowly.  You will continue to monitor your oxygen on your remote monitoring application that is to be programmed on your phone.    You will follow-up with your primary care physician via telehealth.  Please have your primary care physician recheck your thyroid levels, as your thyroid level was low at 0.260, have them recheck it in about 4 to 6 weeks .if your thyroid level continues to be low they will have to start you on medication.      You will be provided with an albuterol inhaler that you can uses as needed when you  feel acutely short of breath.    Please take care and be well!      Discharge Instructions    Discharged to home by car with relative. Discharged via wheelchair, hospital escort: Yes.  Special equipment needed: Oxygen    Be sure to schedule a follow-up appointment with your primary care doctor or any specialists as instructed.     Discharge Plan:   Diet Plan: Discussed  Activity Level: Discussed  Confirmed Follow up Appointment: Patient to Call and Schedule Appointment  Confirmed Symptoms Management: Discussed  Medication Reconciliation Updated: Yes    I understand that a diet low in cholesterol, fat, and sodium is recommended for good health. Unless I have been given specific instructions below for another diet, I accept this instruction as my diet prescription.   Other diet:     Special Instructions: None    Is patient discharged on Warfarin / Coumadin?   No     Depression / Suicide Risk    As you are discharged from this [facility], it is important to learn how to keep safe from harming yourself.    Recognize the warning signs:  Abrupt changes in personality, positive or negative- including increase in energy   Giving away possessions  Change in eating patterns- significant weight changes-  positive or negative  Change in sleeping patterns- unable to sleep or sleeping all the time   Unwillingness or inability to communicate  Depression  Unusual sadness, discouragement and loneliness  Talk of wanting to die  Neglect of personal appearance   Rebelliousness- reckless behavior  Withdrawal from people/activities they love  Confusion- inability to concentrate     If you or a loved one observes any of these behaviors or has concerns about self-harm, here's what you can do:  Talk about it- your feelings and reasons for harming yourself  Remove any means that you might use to hurt yourself (examples: pills, rope, extension cords, firearm)  Get professional help from the community (Mental Health, Substance  Abuse, psychological counseling)  Do not be alone:Call your Safe Contact- someone whom you trust who will be there for you.  Call your local CRISIS HOTLINE 017-4627 or 764-888-5607  Call your local Children's Mobile Crisis Response Team [place] (925) 993-5177 or www.[employer]  Call the toll free National Suicide Prevention Hotlines   National Suicide Prevention Lifeline 275-156-HWOI (7114)  National [employer] Line Network 800-SUICIDE (689-3292)

## 2022-06-16 NOTE — PROGRESS NOTES
Pt discharged home with family. Oxygen and home monitoring set up. Pt IV and tele removed. Pt meds to beds delivered and all belongings sent with pt. Pt signed AVS and copy sent with pt. No complications.

## 2022-06-16 NOTE — PROGRESS NOTES
4 Eyes Skin Assessment Completed by NENA Benjamin and NENA Whitlock.    Head WDL  Ears WDL  Nose WDL  Mouth WDL  Neck WDL  Breast/Chest WDL  Shoulder Blades WDL  Spine Bruising  (R) Arm/Elbow/Hand WDL  (L) Arm/Elbow/Hand WDL  Abdomen WDL  Groin Redness and Blanching  Scrotum/Coccyx/Buttocks WDL  (R) Leg Edema  (L) Leg Edema  (R) Heel/Foot/Toe WDL  (L) Heel/Foot/Toe WDL          Devices In Places Tele Box, Pulse Ox and Nasal Cannula      Interventions In Place NC W/Ear Foams, Waffle Overlay, Pillows and Pressure Redistribution Mattress    Possible Skin Injury No    Pictures Uploaded Into Epic N/A  Wound Consult Placed N/A  RN Wound Prevention Protocol Ordered No

## 2022-06-16 NOTE — DISCHARGE SUMMARY
"Discharge Summary    CHIEF COMPLAINT ON ADMISSION  Chief Complaint   Patient presents with   • Shortness of Breath     Started yesterday, was seen by [facility].   \"65% oxygen sat on room air, corrects to 97% on 4L NC\"    • Rib Pain     X 3-4 weeks        Reason for Admission  rib pain, sent by      Admission Date  6/15/2022    CODE STATUS  Full Code    HPI & HOSPITAL COURSE  This is a 62 year old female with PMHx of CVA with residual left facial numbness, chronic back pain with sciatica s/p lumbar fusion in 2006 on chronic opiates, COVID vaccinated x2 and former tobacco use who was admitted on 06/15/2022 with acute hypoxemic respiratory failure secondary to COVID-19 pneumonia.    Chest x-ray with no evidence of infiltrates or effusions.  Patient noted to be saturating well on 2 L at rest, does require oxygen at rest and on exertion.  Patient will be discharged with home O2 monitoring and home O2.  Meds to beds sent for her continue course of Decadron, albuterol inhaler, Mucinex, and Pepcid.    Patient's  and son were at bedside, updated on plan of care, all questions answered.    Patient has a telehealth appoint with her primary care physician next week.  Instructed to slowly wean herself off oxygen as tolerated.    Patient's TSH is noted to be low, recommended patient to have it repeated in 4 to 6 weeks with her primary care physician, if continues to be low, she would need to be started on medical therapy.    Patient has been accepted by home health.    Therefore, she is discharged in good and stable condition to home with organized home healthcare and close outpatient follow-up.    The patient recovered much more quickly than anticipated on admission.    Discharge Date  06/16/2022    FOLLOW UP ITEMS POST DISCHARGE  Primary care physician    DISCHARGE DIAGNOSES  Principal Problem:    Acute hypoxemic respiratory failure (HCC) POA: Yes  Resolved Problems:    * No resolved hospital problems. " *      FOLLOW UP  [facility] [place]  [address]  [place] [address]  [phone]          MEDICATIONS ON DISCHARGE     Medication List      START taking these medications      Instructions   albuterol 108 (90 Base) MCG/ACT Aers inhalation aerosol   Inhale 2 Puffs every 6 hours as needed for Shortness of Breath.  Dose: 2 Puff     dexamethasone 6 MG Tabs  Start taking on: June 17, 2022  Commonly known as: DECADRON   [place] 1 tableta por vía oral diariamente por 8 días.  (Take 1 Tablet by mouth every day for 8 days.)  Dose: 6 mg     famotidine 20 MG Tabs  Commonly known as: PEPCID   [place] 1 tableta por vía oral 2 veces al día por 10 días.  (Take 1 Tablet by mouth 2 times a day for 10 days.)  Dose: 20 mg     guaiFENesin  MG Tb12  Commonly known as: MUCINEX   Take 2 Tablets by mouth every 12 hours for 5 days.  Dose: 1,200 mg        CONTINUE taking these medications      Instructions   BIOTIN PO   Take 1 Tablet by mouth every day.  Dose: 1 Tablet     gabapentin 600 MG tablet  Commonly known as: NEURONTIN   Take 1,200 mg by mouth at bedtime. 2 tablets = 1,200 mg.  Dose: 1,200 mg     HYDROcodone/acetaminophen  MG Tabs  Commonly known as: NORCO   Take 1 Tablet by mouth every four hours as needed for Severe Pain.  Dose: 1 Tablet     methocarbamol 750 MG Tabs  Commonly known as: ROBAXIN   Take 750-1,500 mg by mouth 3 times a day as needed (Muscle Spasms). 1 to 2 tablets = 750 to 1,500 mg.  Dose: 750-1,500 mg     prednisoLONE acetate 1 % Susp  Commonly known as: PRED FORTE   Administer 1 Drop into the left eye 2 times a day.  Dose: 1 Drop        STOP taking these medications    methylPREDNISolone 4 MG Tbpk  Commonly known as: MEDROL DOSEPAK            Allergies  No Known Allergies    DIET  Orders Placed This Encounter   Procedures   • Diet Order Diet: Regular     Standing Status:   Standing     Number of Occurrences:   1     Order Specific Question:   Diet:     Answer:   Regular [1]        ACTIVITY  As tolerated.  Weight bearing as tolerated    CONSULTATIONS  None    PROCEDURES  None    LABORATORY  Lab Results   Component Value Date    SODIUM 135 06/16/2022    POTASSIUM 4.3 06/16/2022    CHLORIDE 95 (L) 06/16/2022    CO2 31 06/16/2022    GLUCOSE 142 (H) 06/16/2022    BUN 15 06/16/2022    CREATININE 0.49 (L) 06/16/2022    CREATININE 0.7 03/28/2008        Lab Results   Component Value Date    WBC 6.9 06/16/2022    HEMOGLOBIN 13.6 06/16/2022    HEMATOCRIT 42.4 06/16/2022    PLATELETCT 263 06/16/2022      DX-CHEST-PORTABLE (1 VIEW)   Final Result      1.  No acute cardiopulmonary abnormality identified.      2.  Enlarged cardiac silhouette           Total time of the discharge process exceeds 45 minutes.

## 2022-06-16 NOTE — CARE PLAN
Problem: Pain - Standard  Goal: Alleviation of pain or a reduction in pain to the patient’s comfort goal  Outcome: Progressing     Problem: Knowledge Deficit - Standard  Goal: Patient and family/care givers will demonstrate understanding of plan of care, disease process/condition, diagnostic tests and medications  Outcome: Progressing   The patient is Stable - Low risk of patient condition declining or worsening    Shift Goals  Clinical Goals: improve oxygenation  Patient Goals: sleep  Family Goals: rest    Progress made toward(s) clinical / shift goals:  patient encouraged to report pain as necessary, interventions in place to reduce pain aggravating factors; patient and  updated on plan of care, report no questions at this time.     Patient is not progressing towards the following goals:

## 2022-06-16 NOTE — PROGRESS NOTES
Patient transferred to Room 171. Stable  Bedside report given to Cassidy BARRETT. All belongings transferred with patient. Family member at bedside.

## 2022-06-16 NOTE — DISCHARGE PLANNING
Agency/Facility Name: [facility]   Spoke To: Jo   Outcome: Per  patient has been accepted to facility however services will not be able to get started until next week.

## 2022-06-16 NOTE — HOSPITAL COURSE
This is a 62 year old female with PMHx of CVA with residual left facial numbness, chronic back pain with sciatica s/p lumbar fusion in 2006 on chronic opiates, COVID vaccinated x2 and former tobacco use who was admitted on 06/15/2022 with acute hypoxemic respiratory failure secondary to COVID-19 pneumonia.    Chest x-ray with no evidence of infiltrates or effusions.  Patient noted to be saturating well on 2 L at rest, does require oxygen at rest and on exertion.  Patient will be discharged with home O2 monitoring and home O2.  Meds to beds sent for her continue course of Decadron, albuterol inhaler, Mucinex, and Pepcid.    Patient's  and son were at bedside, updated on plan of care, all questions answered.    Patient has a telehealth appoint with her primary care physician next week.  Instructed to slowly wean herself off oxygen as tolerated.    Patient's TSH is noted to be low, recommended patient to have it repeated in 4 to 6 weeks with her primary care physician, if continues to be low, she would need to be started on medical therapy.    Patient has been accepted by home health.

## 2022-06-16 NOTE — DISCHARGE PLANNING
Received Choice form at 1020  Agency/Facility Name: [facility]   Referral sent per Choice form @ 1020      Received Choice form at 1020  [facility]ency[facility]Facility Name: [facility]  Referral sent per Choice form @ 1142

## 2022-06-16 NOTE — RESPIRATORY CARE
REMOTE MONITORING PROGRAM by RESPIRATORY THERAPY  2022 at 3:58 PM by EDUARDO Meyers             Patient's name: Kayla Hernández       MRN: [id number]       : 1960  Physical address: [address] [place] [address] [place] [address]  Cell phone # [phone]  Current oxygen requirement  3L  Ref # [id number] & Lot # [id number]  Emergency contact name & number: Mike Charles [phone]  Primary language Hebrew  Covid         Patient agrees to Remote Monitoring program. Device instruction performed with welcome packet, consent signed and placed at bedside chart. Patient instructed on how to use MyChart and Zoom. No further questions at this time.

## 2022-06-16 NOTE — ED NOTES
Report received from NENA Hew[facility]. Patient resting on gurney, breathing unlabored, on 2L. Family at bedside and able to translate.

## 2022-06-16 NOTE — FACE TO FACE
Face to Face Note  -  Durable Medical Equipment    Maren Sarmiento D.O. - NPI: [id number]  I certify that this patient is under my care and that they had a durable medical equipment(DME)face to face encounter by myself that meets the physician DME face-to-face encounter requirements with this patient on:    Date of encounter:   Patient:                    MRN:                       YOB: 2021  Kayla Hernández  [id number]  1960     The encounter with the patient was in whole, or in part, for the following medical condition, which is the primary reason for durable medical equipment:  Covid-19 Infection    I certify that, based on my findings, the following durable medical equipment is medically necessary:  Oxygen.    HOME O2 Saturation Measurements:(Values must be present for Home Oxygen orders)  Room air sat at rest: 90  Room air sat with amb: 77  With liters of O2: 3, O2 sat at rest with O2: 93  With Liters of O2: 3, O2 sat with amb with O2 : 91  Is the patient mobile?: Yes    My Clinical findings support the need for the above equipment due to:  Hypoxia    Supporting Symptoms: N/A    If patient feels more short of breath, they can go up to 6 liters per minute and contact healthcare provider.

## 2022-06-16 NOTE — DISCHARGE PLANNING
Case Management Discharge Planning    Admission Date: 6/15/2022  GMLOS: 5.4  ALOS: 1    6-Clicks ADL Score: 20  6-Clicks Mobility Score: 17  PT and/or OT Eval ordered: Yes  Post-acute Referrals Ordered: Yes  Post-acute Choice Obtained: Yes  Has referral(s) been sent to post-acute provider:  Yes      Anticipated Discharge Dispo:  D/T to home under [facility] care in anticipation of covered skilled care (06)    DME Needed: Yes    DME Ordered: Yes    Action(s) Taken: Choice obtained and Referral(s) sent. LSW spoke to patient via room phone. Patient reports that she lives with her spouse. Patient independent at home. Patient agreeable to HH but does not have PCP. Patient gave choice via phone for both.     Escalations Completed: None    Medically Clear: Yes    Next Steps: f/u on HH and O2    Barriers to Discharge: DME and Oxygen Delivery    Is the patient up for discharge tomorrow: No

## 2022-06-16 NOTE — PROGRESS NOTES
Patient brought up to floor. Tele monitor in place. 4L NC in place,  on. Patient is A&Ox4. Belongings at bedside. Call light within reach.

## 2022-06-17 ENCOUNTER — TELEPHONE (OUTPATIENT)
Dept: OTHER | Facility: MEDICAL CENTER | Age: 62
End: 2022-06-17
Payer: COMMERCIAL

## 2022-06-17 VITALS — HEART RATE: 99 BPM | OXYGEN SATURATION: 80 %

## 2022-06-17 VITALS — HEART RATE: 102 BPM | RESPIRATION RATE: 24 BRPM | OXYGEN SATURATION: 91 %

## 2022-06-17 VITALS — OXYGEN SATURATION: 95 % | HEART RATE: 110 BPM

## 2022-06-17 NOTE — TELEPHONE ENCOUNTER
RPM Notification: Disconnect  Actions: Device Troubleshoot    Total time (minutes) spent communicating with patient: 5    Patient was disconnected so we called and her battery was not working right so we trouble shooted with her and she is now showing back up.

## 2022-06-17 NOTE — TELEPHONE ENCOUNTER
RPM Notification: Reconnect  Actions: None    Total time (minutes) spent communicating with patient: 0    Patient reconnected and at 95%

## 2022-06-17 NOTE — TELEPHONE ENCOUNTER
RPM Notification: Reconnect  Actions: None    Total time (minutes) spent communicating with patient: 0    At 0936 Reconnect. Pt was able to get reconnected back to monitor. Pt EC called earlier this morning let us know pt was wanting to shower. Pt was off the monitor for about 38 minutes.

## 2022-06-17 NOTE — TELEPHONE ENCOUNTER
RPM Notification: Disconnect  Actions: None    Total time (minutes) spent communicating with patient: 1    Patient became disconnected again. Called patient and phone was handed over to son. Son stated that he tried to wipe the chip and replace in battery but it did not work. He stated that he is going to replace the band and see if they can reconnect.

## 2022-06-17 NOTE — TELEPHONE ENCOUNTER
RPM Notification: Disconnect  Actions: None    Total time (minutes) spent communicating with patient: 2    At 0832 Disconnect. Pt EC called to wonder what the pt has to do since she wants to shower. I advised him that someone needs to just give us a call as a courtesy to let us know. Then pt can take the monitor off shower and we can give her as much time as she needs. I advised him to have her keep her oxygen on while showering. I stated that once she is done then she can put her monitor back on and get reconnected. Make sure reba is working, bluetooth is connected and they will see a solid blue light on showing. I advised him if they need help to give us a call, if not we can see her reconnect if we show long period of disconnect then we will call to follow up and can assist. Pt EC understood.

## 2022-06-17 NOTE — TELEPHONE ENCOUNTER
RPM Notification: High Alert  Actions: None    Total time (minutes) spent communicating with patient: 2    Called patient, she is Hebrew speaking but was able to get a family member to help out. EC stated her o2 came off while she was sleeping. She put it back on and her levels came back up.

## 2022-06-18 ENCOUNTER — TELEPHONE (OUTPATIENT)
Dept: OTHER | Facility: MEDICAL CENTER | Age: 62
End: 2022-06-18
Payer: COMMERCIAL

## 2022-06-18 VITALS — OXYGEN SATURATION: 93 % | HEART RATE: 86 BPM

## 2022-06-18 VITALS — HEART RATE: 99 BPM | OXYGEN SATURATION: 94 % | RESPIRATION RATE: 19 BRPM

## 2022-06-18 VITALS — OXYGEN SATURATION: 94 %

## 2022-06-18 NOTE — TELEPHONE ENCOUNTER
RPM Notification: Disconnect  Actions: None    Total time (minutes) spent communicating with patient: 5    Pt became disconnected at  0053 . Contacted patient and patient reported she was doing well and not sure why she was disconnected. She went to wake up her son to help her reconnect because she was not sure on what she needed to press to reconnect back to bluetooth. Patient reconnected at 0053 with SpO2 of 93%.

## 2022-06-19 ENCOUNTER — TELEPHONE (OUTPATIENT)
Dept: OTHER | Facility: MEDICAL CENTER | Age: 62
End: 2022-06-19
Payer: COMMERCIAL

## 2022-06-19 VITALS — OXYGEN SATURATION: 96 % | HEART RATE: 78 BPM

## 2022-06-19 VITALS — OXYGEN SATURATION: 76 % | RESPIRATION RATE: 19 BRPM | HEART RATE: 82 BPM

## 2022-06-19 VITALS — OXYGEN SATURATION: 92 % | RESPIRATION RATE: 23 BRPM | HEART RATE: 83 BPM

## 2022-06-19 NOTE — TELEPHONE ENCOUNTER
RPM Notification: Disconnect  Actions: None    Total time (minutes) spent communicating with patient: 1    Patient became disconnected at 1041. Contacted patient. Patient disconnect to take a shower and will be reconnect when done.

## 2022-06-19 NOTE — TELEPHONE ENCOUNTER
RPM Notification: Disconnect  Actions: None    Total time (minutes) spent communicating with patient: 2    At 1710 Disconnect. Pt contacted us asking how she goes about showering. I stated to her she calls lets us know. I advised her the monitor can come off since not waterproof. Pt asked if she keeps oxygen on and it is fine in the shower. I advised her yes it is she wants to keep her oxygen on while she showers. I stated once she is finished to just put back on the monitor and have a solid blue light showing she is reconnected. Pt understood and stated she is going to shower. At 1735 Reconnect. Pt has been reconnected back to device.

## 2022-06-19 NOTE — TELEPHONE ENCOUNTER
RPM Notification: Reconnect  Actions: Device Troubleshoot    Total time (minutes) spent communicating with patient: 3    Pt called stated that she changed battery and wanted to make sure she was reconnected, I did inform that she was still disconnected and assisted in trouble shooting    Pt reconnected

## 2022-06-19 NOTE — TELEPHONE ENCOUNTER
RPM Notification: Reconnect  Actions: None    Total time (minutes) spent communicating with patient: 1    Patient reconnected at 1045 with SpO2 of 96%.

## 2022-06-19 NOTE — TELEPHONE ENCOUNTER
RPM Notification: High Alert  Actions: None    Total time (minutes) spent communicating with patient: 1    Called Pt stated she is okay O2 fell off while sleeping and is now on

## 2022-06-19 NOTE — TELEPHONE ENCOUNTER
RPM Notification: Disconnect  Actions: Device Troubleshoot    Total time (minutes) spent communicating with patient: 1    Called Pt stated she is okay but the battery is dead, will change it and reconnect

## 2022-06-20 ENCOUNTER — TELEPHONE (OUTPATIENT)
Dept: OTHER | Facility: MEDICAL CENTER | Age: 62
End: 2022-06-20
Payer: COMMERCIAL

## 2022-06-20 LAB
BACTERIA BLD CULT: NORMAL
BACTERIA BLD CULT: NORMAL
SIGNIFICANT IND 70042: NORMAL
SIGNIFICANT IND 70042: NORMAL
SITE SITE: NORMAL
SITE SITE: NORMAL
SOURCE SOURCE: NORMAL
SOURCE SOURCE: NORMAL

## 2022-06-20 NOTE — TELEPHONE ENCOUNTER
RPM Notification: High Alert  Actions: None    Total time (minutes) spent communicating with patient: 2    Patient alerted for low O2:   Patient Kayla Hernández SpO2 Very Low: 83 %Jun 20, 2022 06:52    I called to let her know she alerted for low O2. Patient said she is doing good. While on the phone with her she did go back up to 94%    Will continue to monitor her and call her if she alerts or disconnects.

## 2022-06-21 ENCOUNTER — TELEPHONE (OUTPATIENT)
Dept: OTHER | Facility: MEDICAL CENTER | Age: 62
End: 2022-06-21
Payer: COMMERCIAL

## 2022-06-21 VITALS — HEART RATE: 89 BPM | OXYGEN SATURATION: 92 % | RESPIRATION RATE: 8 BRPM

## 2022-06-21 NOTE — TELEPHONE ENCOUNTER
RPM Notification: Disconnect  Actions: None    Total time (minutes) spent communicating with patient: 2    Kayla called to let us know she was going to disconnect for a shower and will reconnect when she is done.  Will look for her to reconnect.

## 2022-06-21 NOTE — TELEPHONE ENCOUNTER
RPM  5 min  Disconnected. Called, and let her know. She said light was green. When I checked the activity it said defective.  and another male was helping her. I asked that they take chip out and place back in. She has closed the reba out. Once it was opened back up, she was reconnected.

## 2022-06-22 ENCOUNTER — TELEPHONE (OUTPATIENT)
Dept: OTHER | Facility: MEDICAL CENTER | Age: 62
End: 2022-06-22
Payer: COMMERCIAL

## 2022-06-22 ENCOUNTER — TELEMEDICINE (OUTPATIENT)
Dept: SLEEP MEDICINE | Facility: MEDICAL CENTER | Age: 62
End: 2022-06-22
Payer: COMMERCIAL

## 2022-06-22 ENCOUNTER — TELEPHONE (OUTPATIENT)
Dept: OTHER | Facility: MEDICAL CENTER | Age: 62
End: 2022-06-22

## 2022-06-22 VITALS — OXYGEN SATURATION: 94 % | RESPIRATION RATE: 6 BRPM | HEART RATE: 89 BPM

## 2022-06-22 VITALS — HEART RATE: 69 BPM | RESPIRATION RATE: 15 BRPM | OXYGEN SATURATION: 93 %

## 2022-06-22 VITALS — RESPIRATION RATE: 17 BRPM | OXYGEN SATURATION: 95 % | HEART RATE: 89 BPM

## 2022-06-22 VITALS — HEART RATE: 73 BPM | RESPIRATION RATE: 9 BRPM | OXYGEN SATURATION: 96 %

## 2022-06-22 VITALS — HEART RATE: 91 BPM | OXYGEN SATURATION: 94 %

## 2022-06-22 DIAGNOSIS — J96.01 ACUTE HYPOXEMIC RESPIRATORY FAILURE (HCC): ICD-10-CM

## 2022-06-22 PROCEDURE — 99999 PR NO CHARGE: CPT | Performed by: INTERNAL MEDICINE

## 2022-06-22 NOTE — TELEPHONE ENCOUNTER
RPM Notification: Disconnect  Actions: None    Total time (minutes) spent communicating with patient: 2    Pt became disconnected at  1041 . Contacted patient and patient reported she was doing well. She was wondering when she could take off the oxygen completely. She stated that yesterday the home nurse took her pulse ox and she was in the 90s. I asked her if they took it with oxygen or without oxygen. She stated with oxygen at 2.5L. I let her know to please not remove the oxygen. She has an appointment on the phone with Dr. Catherine and he will advise as to when it will be appropriate for her to take off oxygen. Pt stated she was feeling well and felt no difference with it on. I told her I understood but it was recommended for her to use her oxygen until pulmonologist instructed and that would be today and to please keep her phone by her so she could her the call. Patient reconnected at 1043 with SpO2 of 94%.

## 2022-06-22 NOTE — PROGRESS NOTES
Tried to call several times and got no answer. Left 2 voicemails.  Mando Catherine MD FACP  Pulmonary/Critical Care

## 2022-06-22 NOTE — TELEPHONE ENCOUNTER
RPM  6 min  Kayla disconnected. I called and her  helped her. They couldn't find the [employer] reba. Once they found he said he had to log her back in. He said its loading and it froze at 50%. I asked him to turn her phone off and back on, and go back into the [employer] reba. He was able to get reconnected. I called back and let him know she was reconnected

## 2022-06-22 NOTE — TELEPHONE ENCOUNTER
RPM  Called Kayla to let her know she is disconnected. No answer. I text her letting her know she was disconnected, translating it into Vincentian. She reconnected. I text her again thanking her, and that she is reconnected

## 2022-06-23 ENCOUNTER — TELEPHONE (OUTPATIENT)
Dept: OTHER | Facility: MEDICAL CENTER | Age: 62
End: 2022-06-23
Payer: COMMERCIAL

## 2022-06-23 VITALS — OXYGEN SATURATION: 83 % | HEART RATE: 90 BPM | RESPIRATION RATE: 24 BRPM

## 2022-06-23 VITALS — HEART RATE: 79 BPM | OXYGEN SATURATION: 88 % | RESPIRATION RATE: 13 BRPM

## 2022-06-23 VITALS — HEART RATE: 86 BPM | OXYGEN SATURATION: 95 % | RESPIRATION RATE: 16 BRPM

## 2022-06-23 VITALS — RESPIRATION RATE: 15 BRPM | HEART RATE: 120 BPM | OXYGEN SATURATION: 82 %

## 2022-06-23 VITALS — OXYGEN SATURATION: 81 %

## 2022-06-23 NOTE — TELEPHONE ENCOUNTER
RPM Notification: low oxygen reading  Actions: phone call    Total time (minutes) spent communicating with patient: 1 min    Patient alerted for low oxygen reading of 87% at 0412. Called patient once and no answer. Got a hold of  and stated he wasn't home to call again. Called her and she answered. Asked patient if she felt ok and she said yes and advised patient to make sure she had her oxygen in. While talking to patient; oxygen went up to 95%.    Will continue to monitor.

## 2022-06-23 NOTE — TELEPHONE ENCOUNTER
RPM Notification: High Alert  Actions: None    Total time (minutes) spent communicating with patient: 3    At 1227 alert SpO2 82%.   At 1233 alert SpO2 83%.   At 1240 alert SpO2 74%.   Contacted pt. Pt advising me that she is okay. Pt got her son on the phone. He stated that they ran out to run a few errands and her portable tank ran out. He stated that they are back home and going to hook her back up to her oxygen. Pt and son stating that she is fine and to just give her a few minutes to get re-set up. I advised him that is okay just wanted to make sure and I will keep an eye out on her oxygen. At 1248 SpO2 92%.

## 2022-06-23 NOTE — TELEPHONE ENCOUNTER
RPM Notification: High Alert and Patient Communication  Actions: None    Total time (minutes) spent communicating with patient: 5    At 0946 pt called to talk about her appointment. I asked her if she needed me to get an  on line or had anyone available at home. Pt stated please get an . I advised her I will have to disconnect with her to get one and we will call her back. Pt stated that is fine. At 0947 contacted services to get an  and spoke with Federico, he tried calling pt at least 3 times to get a hold of to connect the lines and her line was stating it was busy. I told him I will reach out back out if needed but will call her to see what is the best time to call her with the . At 0955 contacted pt back. Pt had her son on the line to interpret for us. He stated that she is concerned if her appointment is for tomorrow since he has work that there will need to be an  for her. I stated to him that the pulmonologist during her telephone appointment tomorrow is Wallisian speaking. He advised pt of that and stated her appointment for tomorrow is fine, since  is Wallisian speaking. He did ask when the appointment was on time. I stated that there is no set time, since the doctor sees other appointments in clinic so once available he will reach out to her and call. I stated that it is best if she keeps her phone on her between the hours of [phone]. Pt and son understood and keep pt phone on her tomorrow to answer.       At 1035 alert SpO2 83%. Did not contact pt since oxygen returned back up to normal range within a minute. At 1036 SpO2 91%.

## 2022-06-23 NOTE — TELEPHONE ENCOUNTER
RPM Notification: low oxygen   Actions: telephone     Total time (minutes) spent communicating with patient: 1 min    Called patient about low oxygen reading of 83% at 2347. Patient stated she was ok. Asked patient to make sure oxygen was in her nose. Patient is now back up to 94%.    Will continue to monitor.

## 2022-06-23 NOTE — TELEPHONE ENCOUNTER
RPM Notification: High Alert  Actions: None    Total time (minutes) spent communicating with patient: 1    At 1305 alert SpO2 81%.   At 1310 alert SpO2 81%.   Contacted pt and advising me that she is fine. Pt wearing oxygen. Pt oxygen during call went right back up to normal range, SpO2 93%.

## 2022-06-23 NOTE — TELEPHONE ENCOUNTER
RPM Notification: Disconnect and Reconnect  Actions: Device Troubleshoot    Total time (minutes) spent communicating with patient: 2    At 0911 Disconnect. Contacted pt via text to translate message stating that she was disconnected and some troubleshooting steps to help her with getting reconnected for us. At 0922 Reconnect. Pt was off the monitor for about 11 minutes. Pt then called us stating that she fixed the monitor and wanted to make sure we could see her vitals again. I advised her that we could. She did state that her appointment was missed and wanted the doctor to call her today. I advised her that the pulmonologist clinic rescheduled her appointment for Friday tomorrow on the 6/24 with Dr. Catherine. Pt stated no that she needed her appointment needed to be today since she has a family member with her to help translate. I told her if the doctor doesn't speak Estonian they usually get a  on. Pt stated no she wants it scheduled for today. I advised her I will escalate it and see what I can do and will get back to her. Reached out to [facility]ays and advised her about pt wanting her appointment for today.

## 2022-06-24 ENCOUNTER — TELEPHONE (OUTPATIENT)
Dept: OTHER | Facility: MEDICAL CENTER | Age: 62
End: 2022-06-24
Payer: COMMERCIAL

## 2022-06-24 VITALS — HEART RATE: 83 BPM | RESPIRATION RATE: 11 BRPM | OXYGEN SATURATION: 82 %

## 2022-06-24 VITALS — RESPIRATION RATE: 25 BRPM | OXYGEN SATURATION: 97 % | HEART RATE: 89 BPM

## 2022-06-24 VITALS — OXYGEN SATURATION: 82 % | HEART RATE: 92 BPM | RESPIRATION RATE: 20 BRPM

## 2022-06-24 NOTE — TELEPHONE ENCOUNTER
RPM Notification: High Alert  Actions: None    Total time (minutes) spent communicating with patient: 0    At 1409 alert SpO2 82%. Did not contact pt since oxygen returned right back up to normal range, SpO2 95%.

## 2022-06-24 NOTE — TELEPHONE ENCOUNTER
RPM Notification: High Alert  Actions: None    Total time (minutes) spent communicating with patient: 1    Called Pt no answer/ Called Ec stated he is not with pt right now bu will attempt to get in contact with pt and have them call back to verify that they are okay    Informed charge, will attempt a call back in a few minutes if no responce

## 2022-06-25 ENCOUNTER — TELEPHONE (OUTPATIENT)
Dept: OTHER | Facility: MEDICAL CENTER | Age: 62
End: 2022-06-25
Payer: COMMERCIAL

## 2022-06-25 VITALS — RESPIRATION RATE: 9 BRPM | HEART RATE: 111 BPM | OXYGEN SATURATION: 75 %

## 2022-06-25 VITALS — HEART RATE: 82 BPM | RESPIRATION RATE: 17 BRPM | OXYGEN SATURATION: 98 %

## 2022-06-25 VITALS — RESPIRATION RATE: 22 BRPM | OXYGEN SATURATION: 82 % | HEART RATE: 100 BPM

## 2022-06-25 VITALS — OXYGEN SATURATION: 92 % | HEART RATE: 88 BPM | RESPIRATION RATE: 27 BRPM

## 2022-06-25 VITALS — OXYGEN SATURATION: 77 % | HEART RATE: 99 BPM | RESPIRATION RATE: 20 BRPM

## 2022-06-25 VITALS — OXYGEN SATURATION: 80 % | HEART RATE: 128 BPM | RESPIRATION RATE: 16 BRPM

## 2022-06-25 VITALS — RESPIRATION RATE: 17 BRPM | OXYGEN SATURATION: 96 % | HEART RATE: 103 BPM

## 2022-06-25 VITALS — HEART RATE: 79 BPM | RESPIRATION RATE: 10 BRPM | OXYGEN SATURATION: 94 %

## 2022-06-25 NOTE — TELEPHONE ENCOUNTER
RPM Notification: Disconnect  Actions: Device Troubleshoot    Total time (minutes) spent communicating with patient: 2    Called pt unable to reconnect, attempted to change batteries, restarted phone, made sure bluetooth was connected, cleaned out chip, made sure pt was signed into reba, etc. Attempted all trouble shooting measures with no success.    Informed charge Q4 until reconnection

## 2022-06-25 NOTE — TELEPHONE ENCOUNTER
RPM Notification: Patient Communication  Actions: Physician Notification    Total time (minutes) spent communicating with patient: 5    Called Pt informed of new appointment time

## 2022-06-25 NOTE — TELEPHONE ENCOUNTER
RPM Notification: Reconnect  Actions: None    Total time (minutes) spent communicating with patient: 0    Pt reconnected

## 2022-06-25 NOTE — TELEPHONE ENCOUNTER
RPM Notification: Patient Communication  Actions: None    Total time (minutes) spent communicating with patient: 24 min      Called patient on Language line about missing Dr Catherine phone call. Patient stated she never received a phone call on her end. Patient is very upset and just wanted to speak to a provider and is asking to speak to a new doctor. I told patient that she couldn't speak to a new one since Dr Catherine is our RPM doctor on call this week.   Patient stated that she got upset at the last time she was in the hospital because her nurse barley talked to her and explained anything she was doing and she felt it was because of her not speaking english. I sympathized with patient and told her this is why I called using the language line so that we could talk about everything we needed and that we wouldn't miss anything she was trying to tell us.   I advised patient that we would get her rescheduled for a new appointment and it might not be with a Jamaican speaking doctor and that we would use the language line again possibly. Patient stated she understood and was ok with that.   Patient told me that her son who speaks english will be off on Monday and that he can help translate if need be.   Sons name: Isaac Charles [phone]   Patient is understandable about missing phone call and rescheduling and felt reassured.   Will escalate to nurse supervisor Lis about incident and to get her rescheduled.   Once we get a new date and time for appointment we will reach out to patient and confirm and get her all set up.

## 2022-06-25 NOTE — TELEPHONE ENCOUNTER
RPM Notification: Reconnect  Actions: None    Total time (minutes) spent communicating with patient: 0    At 1158 Reconnect. Pt reconnected back to monitor. Pt disconnected earlier around 1118 to shower. Pt was off monitor for about 40 minutes.

## 2022-06-25 NOTE — TELEPHONE ENCOUNTER
RPM Notification: High Alert  Actions: None    Total time (minutes) spent communicating with patient: 1    Called Pt stated she is okay cannula fell off while sleeping

## 2022-06-25 NOTE — TELEPHONE ENCOUNTER
RPM Notification: Disconnect  Actions: Device Troubleshoot    Total time (minutes) spent communicating with patient: 2    Called Pt no answer/ called EC/ pt called back stated she is okay and attempted to assist in reconnection

## 2022-06-25 NOTE — TELEPHONE ENCOUNTER
RPM Notification: Disconnect and Patient Communication  Actions: Device Troubleshoot    Total time (minutes) spent communicating with patient: 3    Called Pt and attempted to assist in reconnection      Pt stated that he wristband was had a blue light indicating that pt was connected

## 2022-06-25 NOTE — TELEPHONE ENCOUNTER
RPM Notification: Disconnect and Patient Communication  Actions: Physician Notification    Total time (minutes) spent communicating with patient: 8    At 1059 Pt called me to talk about when her doctor appointment is happening. Pt asked if I spoke Korean. I let her know I understand little but for her concerns I need to disconnect call to get an  on the line. Pt stated that is fine. I ended call and reached out to our [facility] to get an . Spoke with  Mario and merge call with pt. Pt stated that she wanted to verify that her appointment is happening today. She stated that he is constantly being changed around and she is waiting to speak to her doctor about the oxygen. Pt stated that she is over wearing the oxygen and wants to get off of it. I told her I can not give her the go ahead to take the oxygen off. I stated the doctor has to go over that with her. I advised her that the dr appointment will happen today. I apologized to her with her frustration over the appointment dates moving. I advised her Dr. Catherine will get a hold of her today. I stated I will have my Charge RN reach out for her to get a better eta on when the call might happen especially stating her concern for the appointment to address her oxygen needs. I stated the doctor does see other patients so they do it when they have free time to call. I advised her I will escalate this on my end and can reach back out when I hear word. Pt stated that she wants to shower but does not want to miss the call. I stated to her to go ahead and take the monitor off and shower. I advised her I will escalate this and once we hear back from the doctor I will let her know. Pt stated she did not want to miss the call. I advised her that if we hear back from him immediately we will let him know you are showering and need to give her some time. She stated she understand and thanked us for trying to get this taken care. I notified  my Charge RN Elizabeth on pt phone call and stated that pt was informed she was receiving a call from . Stated pt is frustrated with the constant rescheduling and just needs the appointment to happen to address her oxygen needs. Elizabeth advised me she is reaching out to Dr. Catherine now and will let me know. I also advised her pt can not take call at this moment since she is going to be showering and disconnected for a bit.   At 1118 Disconnect. Pt disconnected to shower.

## 2022-06-25 NOTE — TELEPHONE ENCOUNTER
RPM Notification: Disconnect and Reconnect  Actions: None    Total time (minutes) spent communicating with patient: 2      At 1350 Disconnect. Contacted pt via text message to translate to her that she was disconnected from monitor. I stated if she could reconnect for us and let us know if she needs any assistance. I advised her that we have reach out to the pulmonologist and have not received word from him yet. At 1400 Reconnect. Pt was able to get reconnected and asked thru text if we could see her. I stated she has reconnected and thanked her. Charge RN advised me that Dr. Catherine stated that he will call pt today but no estimate time for call. I let pt know  stated he will call her today but no set time. She stated okay and thank you.   At 1410 Dr. Catherine tried calling pt and stated to Charge that line stated caller was busy. He stated he called the  that confirmed her number but still getting line is busy. Pt was texting me that her number is her cell number. I advised her the doctor is trying to call her now. Pt then called me stating her number was correct for the doctor and needs to talk to him its important. Pt gave me another number that I gave to the Charge RN to have the doctor call which was [phone]. Then she gave me a [phone].

## 2022-06-26 ENCOUNTER — TELEPHONE (OUTPATIENT)
Dept: OTHER | Facility: MEDICAL CENTER | Age: 62
End: 2022-06-26
Payer: COMMERCIAL

## 2022-06-26 VITALS — HEART RATE: 131 BPM | OXYGEN SATURATION: 79 %

## 2022-06-26 VITALS — OXYGEN SATURATION: 97 % | HEART RATE: 74 BPM

## 2022-06-26 VITALS — HEART RATE: 98 BPM | OXYGEN SATURATION: 93 % | RESPIRATION RATE: 17 BRPM

## 2022-06-26 NOTE — TELEPHONE ENCOUNTER
RPM Notification: Disconnect  Actions: EMS services    Total time (minutes) spent communicating with patient:0    Attempted to contact both Pt and EC with no success.Was about to dispatch [facility] when EC tabitha called back stated pt is okay and assisted in reconnection.    Pt reconnected

## 2022-06-26 NOTE — TELEPHONE ENCOUNTER
RPM Notification: High Alert  Actions: None    Total time (minutes) spent communicating with patient: 2    Called Pt stated she is okay just took off oxygen to use the restroom, oxygen is back on now

## 2022-06-26 NOTE — TELEPHONE ENCOUNTER
RPM Notification: High Alert and Patient Communication  Actions: None    Total time (minutes) spent communicating with patient: 1 min  Alerted at 83% 1132  Called. She took oxygen off for just a minute. She placed back on and it went right back up. She is currently on 2.5l.

## 2022-06-26 NOTE — TELEPHONE ENCOUNTER
RPM Notification: Disconnect  Actions: None    Total time (minutes) spent communicating with patient: 0    Attempted to contact both the Pt and EC , no answer from both. informed charged and advised to attempt to contact again in a few mins

## 2022-06-26 NOTE — TELEPHONE ENCOUNTER
RPM Notification: High Alert  Actions: None    Total time (minutes) spent communicating with patient: 2    Called Pt stated she is okay took oxygen off to cook dinner and do her nebulizing treatments, oxygen is back on

## 2022-06-26 NOTE — TELEPHONE ENCOUNTER
RPM Notification: High Alert  Actions: None    Total time (minutes) spent communicating with patient: 3    At 1726 alert SpO2 82%.   At 1730 alert SpO2 84%.   Contacted pt. Pt stated she is fine. She advised me that she took off her oxygen to eat but will place it back on. Pt stated that she is good just eating dinner. Pt advised us that she is upset with the outcomes of the doctor appointment not happening. I advised her that we already let our Supervisor RN know so that way she can look at it on Monday and work on getting it rescheduled for her. Pt stated she thanks us for escalating it and hopes she hears about it soon.  At 1736 SpO2 94%.

## 2022-06-26 NOTE — TELEPHONE ENCOUNTER
RPM Notification: High Alert  Actions: None    Total time (minutes) spent communicating with patient: 1    Called Pt stated she is okay took oxygen off to use the restroom but oxygen is back on

## 2022-06-27 ENCOUNTER — TELEPHONE (OUTPATIENT)
Dept: OTHER | Facility: MEDICAL CENTER | Age: 62
End: 2022-06-27
Payer: COMMERCIAL

## 2022-06-27 ENCOUNTER — TELEPHONE (OUTPATIENT)
Dept: OTHER | Facility: MEDICAL CENTER | Age: 62
End: 2022-06-27

## 2022-06-27 VITALS — HEART RATE: 76 BPM | RESPIRATION RATE: 18 BRPM | OXYGEN SATURATION: 93 %

## 2022-06-27 VITALS — OXYGEN SATURATION: 73 %

## 2022-06-27 VITALS — HEART RATE: 74 BPM | OXYGEN SATURATION: 96 % | RESPIRATION RATE: 18 BRPM

## 2022-06-27 NOTE — TELEPHONE ENCOUNTER
RPM Notification: Disconnect, High Alert and Patient Communication  Actions: Device Troubleshoot and None    Total time (minutes) spent communicating with patient: 5 min  Alerted 80% @ 0848, 0853,0858,0902    Called said she was ok. Asked when her appointment was. RN checking. Disconnected. Co-worker that speaks Indonesian called Kayla to let her know she was disconnected and that they wee able to get appointment today with Dr. Morin. She let her know she needs to answer the call, as she has missed multiple phone calls from the Dr. She stated that everyone keeps telling her she missed phone calls. She got upset and said she always answers for us, so she doesn't see how.  At one time talked to the . The  wasn't with Kayla at the time.  Left voice mails, and confirmed that the number was right per notes. We just ask the she answers today. To keep her phone with her at all time.  The son helped her change her battery to reconnect

## 2022-06-27 NOTE — TELEPHONE ENCOUNTER
RPM Notification: High Alert  Actions: None    Total time (minutes) spent communicating with patient: 1    0345- called pt x2, no answer.  Texted him and then his O2 went back up to 94.2.  Will keep a close eye on him.

## 2022-06-27 NOTE — TELEPHONE ENCOUNTER
RPM Notification: High Alert and Patient Communication  Actions: None    Total time (minutes) spent communicating with patient: 6 min    Alerted 78% 1156  74% 1151%  82% 1201  RN aware  Coworker that speaks Faroese called. Kayla is upset wants a new Dr. Took oxygen off. Kayla very angry doesn't want to place oxygen on, even though she is stating low 70's. She wants the Dr. To call now or different Dr. Co worker explained the Dr. Has many appointments. They will let us know right before they can do it and we would let her know she could let her son know.     I tried calling son Mike [phone], to see if he could help calm mom down and place oxygen back on. She was still stating in the 70's. No answer. Called two times.  I called The EC Mike ( ). I let him know she was on the phone upset wanting a new dr. And took her oxygen off. I let him know it was in the 70's and we are hoping he can get her to place oxygen back on, or we would need to call [facility]. Coworker also asked  Teresa to please place oxygen back on or we would need to dispatch [facility]. Mike said go ahead and get off the phone with her, im going to call.    Kayla oxygen went up to 94%.     Mike son called. I explained everything to him. I let him know his mom wants the drKayce To call his phone when DrKayce Is able to do  the appointment. I asked if the number she gave us [phone] is the correct number to call. He stated it was. He also said his mom said she checked her phone log an no one ever called. I asked the he checks her messages, they left messages, they also talked to your dad and confirmed the number they called was correct

## 2022-06-27 NOTE — TELEPHONE ENCOUNTER
Kayla called at 1001 and talked to a co work that speaks S[place]. She asked that we have the  Call her son Mike [phone]. She ask that we also let her know when  Is going to call so she can let him know. I let the Rn know and when  Messages her she will let them know

## 2022-06-28 ENCOUNTER — TELEPHONE (OUTPATIENT)
Dept: OTHER | Facility: MEDICAL CENTER | Age: 62
End: 2022-06-28
Payer: COMMERCIAL

## 2022-06-28 VITALS — OXYGEN SATURATION: 82 %

## 2022-06-28 VITALS — HEART RATE: 88 BPM | OXYGEN SATURATION: 81 % | RESPIRATION RATE: 18 BRPM

## 2022-06-28 VITALS — OXYGEN SATURATION: 80 % | RESPIRATION RATE: 18 BRPM | HEART RATE: 101 BPM

## 2022-06-28 VITALS — OXYGEN SATURATION: 75 %

## 2022-06-28 VITALS — OXYGEN SATURATION: 84 %

## 2022-06-28 NOTE — TELEPHONE ENCOUNTER
RPM Notification: Disconnect  Actions: None    Total time (minutes) spent communicating with patient: 1    Patient disconnected at 0238. Texted her to make sure she's okay, no response but oxygen did go back up to 95.

## 2022-06-28 NOTE — TELEPHONE ENCOUNTER
RPM Notification: High Alert  Actions: None    Total time (minutes) spent communicating with patient: 0    At 0754 alert SpO2 80%. Did not contact pt since a few minutes later pt oxygen returned right back up to normal range. At 0756 SpO2 91%.

## 2022-06-28 NOTE — TELEPHONE ENCOUNTER
RPM Notification: High Alert  Actions: None    Total time (minutes) spent communicating with patient: 8      Pt alerted high. SPO2 of 75%. Called pt to see if she was ok. Pt said she broke her nasal canula and was looking for her other canula. Took about 8 min for her to find her canula and advised pt to put it on and take a few deep breaths in. Pt also stated she needs a new oxygen tank. Asked pt if she has a spear one and she said yes. Asked pt to just use the oxygen concentrator while she is home and to use the portable when she is out of the house. Also advised pt to call the number on the oxygen tank and order some more. Pt said she would have her son help her call.

## 2022-06-28 NOTE — TELEPHONE ENCOUNTER
RPM Notification: High Alert  Actions: None    Total time (minutes) spent communicating with patient: 1    Pt alerted at 0106 with 82%. Texted pt's son to check on pt. Did not get a response but oxygen did go back up to 94 by 0110

## 2022-06-28 NOTE — TELEPHONE ENCOUNTER
RPM Notification: High Alert  Actions: None    Total time (minutes) spent communicating with patient: 1    Patient alerted with a low SpO2 of 84 at 0006. Reached out to patient's son Guero via text to see if patient is okay. Stated she's doing fine was just changing out her nasal canula tube  because per pt if felt like it was leaking air. Pt's O2 did increase back up to 90 after tube was changed however it did drop back below 90 and it fluctuated a few times. Pt's son informed to have patient take a few deep breaths and checks for any kinks. Oxygen went back up to 90%

## 2022-06-28 NOTE — TELEPHONE ENCOUNTER
RPM Notification: High Alert  Actions: None    Total time (minutes) spent communicating with patient: 3    At 0935 alert SpO2 81%. Pt contacted us. Her son got on the line and stated that she is fine. He let us know that her nasal cannula tubing ripped/broke. He stated they called to see how they could get back ups in case this other one breaks too. I told them they can reach out to the [employer] company the go through for her concentrator and portable tanks. I advised by calling them they can replace and give things for them. He stated they understood and would reach out to them to get extras as a precaution. Pt oxygen during call returned right back up to normal range, SpO2 96%.

## 2022-06-28 NOTE — TELEPHONE ENCOUNTER
RPM Notification: Disconnect  Actions: None    Total time (minutes) spent communicating with patient: 10      Patient had disconnected at 2100 and remained disconnected for about 40 minutes. Had few unsuccessful attempts to contact patient as well as her emergency contact through [employer]. Left a couple of voicemails notifying patient and [facility] if we don't hear from her in the next  10 minutes. [facility] was called and a wellness check was initiated. At 2214 a call came in from patient's son Guero stating that the [employer] reba had froze and that was the reason pt had disconnected. Pt's doing fine and her vitals are stable and did show back up on the reab. Pt's son will be staying with mom through the night and was asked if its okay to contact him for any alerts through the night to which he agreed. His contact number is [phone]. I also did called back to [facility] to cancel dispatch.

## 2022-06-29 ENCOUNTER — TELEPHONE (OUTPATIENT)
Dept: OTHER | Facility: MEDICAL CENTER | Age: 62
End: 2022-06-29
Payer: COMMERCIAL

## 2022-06-29 VITALS — HEART RATE: 80 BPM | RESPIRATION RATE: 15 BRPM | OXYGEN SATURATION: 80 %

## 2022-06-29 VITALS — RESPIRATION RATE: 14 BRPM | OXYGEN SATURATION: 95 % | HEART RATE: 76 BPM

## 2022-06-29 NOTE — TELEPHONE ENCOUNTER
RPM Notification: High Alert and Reconnect  Actions: None    Total time (minutes) spent communicating with patient: 0    At 0637 Reconnect. Was advised by SAHIL Sheehan that pt disconnected and was approved by doctor to perform Q6 check ins. Stated she spoke with pt  before he left for work this morning stating pt is fine and well. Pt ended up getting reconnected back to monitor. At 0647 alert SpO2 72%. Contacted pt. Pt did not answer. At 0655 SpO2 95%.

## 2022-06-29 NOTE — TELEPHONE ENCOUNTER
RPM Notification: disconnect   Actions: telephone call    Total time (minutes) spent communicating with patient: 1    Patient called to inform us that she was going to take off her monitor to take a shower.

## 2022-06-29 NOTE — TELEPHONE ENCOUNTER
RPM Notification: High Alert  Actions: None    Total time (minutes) spent communicating with patient: 2    Called patient to let her know she alerted for loq O2 at:  Patient Kayla Hernández SpO2 Very Low: 83 %Jun 28, 2022 23:53    She is doing good. Will continue to monitor and call her back if she alerts again.

## 2022-06-29 NOTE — TELEPHONE ENCOUNTER
RPM Notification: Disconnect  Actions: None    Total time (minutes) spent communicating with patient: 5    Called patients phone to let her know she was disconnected. Phone went straight to voicemail. Spoke with her  who called her and she reconnected. Will continue to monitor her and call if she disconnects.    none

## 2022-06-30 ENCOUNTER — TELEPHONE (OUTPATIENT)
Dept: OTHER | Facility: MEDICAL CENTER | Age: 62
End: 2022-06-30
Payer: COMMERCIAL

## 2022-06-30 VITALS — RESPIRATION RATE: 15 BRPM | OXYGEN SATURATION: 96 % | HEART RATE: 86 BPM

## 2022-06-30 NOTE — TELEPHONE ENCOUNTER
RPM Notification: Disconnect  Actions: None    Total time (minutes) spent communicating with patient: 1    Called Pt no answer called EC Mike stated he is no longer with pt currently on his way to work, but will attempt to contact pt in hopes to reconnect her soon    Informed charge

## 2022-06-30 NOTE — TELEPHONE ENCOUNTER
RPM Notification: High Alert  Actions: None    Total time (minutes) spent communicating with patient: 2    Called Pt stated she is okay just went to the restroom and blew her nose

## 2022-06-30 NOTE — TELEPHONE ENCOUNTER
RPM Notification: Disconnect and Patient Communication  Actions: None    Total time (minutes) spent communicating with patient: 2    Pt called stated she is okay and her phone fel to the ground and was not able to hear it, will wake son up to help in reconnection

## 2022-06-30 NOTE — TELEPHONE ENCOUNTER
RPM Notification: Disconnect  Actions: None    Total time (minutes) spent communicating with patient: 1    Called Pt stated she is okay just asleep, also stated that the monitor is connected and should populate shortly

## 2022-07-01 ENCOUNTER — TELEPHONE (OUTPATIENT)
Dept: OTHER | Facility: MEDICAL CENTER | Age: 62
End: 2022-07-01
Payer: COMMERCIAL

## 2022-07-01 VITALS — RESPIRATION RATE: 17 BRPM | OXYGEN SATURATION: 95 % | HEART RATE: 84 BPM

## 2022-07-01 VITALS — OXYGEN SATURATION: 89 % | RESPIRATION RATE: 23 BRPM | HEART RATE: 115 BPM

## 2022-07-01 VITALS — HEART RATE: 129 BPM | OXYGEN SATURATION: 93 % | RESPIRATION RATE: 27 BRPM

## 2022-07-01 VITALS — HEART RATE: 99 BPM | OXYGEN SATURATION: 90 % | RESPIRATION RATE: 13 BRPM

## 2022-07-01 VITALS — HEART RATE: 106 BPM | OXYGEN SATURATION: 92 %

## 2022-07-01 NOTE — TELEPHONE ENCOUNTER
RPM Notification: Disconnect  Actions: None    Total time (minutes) spent communicating with patient: 2    Pt became disconnected at  0737 . Contacted patient and patient reported she was doing well but her light was flashing red. She told me she was going to wake up her so that he could change her battery.

## 2022-07-01 NOTE — TELEPHONE ENCOUNTER
RPM Notification: Disconnect  Actions: None    Total time (minutes) spent communicating with patient: 2    Patient called to let us know she would take off her device because she is going to take a shower. She will let us know when she reconnects.

## 2022-07-01 NOTE — TELEPHONE ENCOUNTER
RPM Notification: Reconnect  Actions: None    Total time (minutes) spent communicating with patient: 2    Patient reconnected at 0957 w/ SpO2 of 90%.

## 2022-07-01 NOTE — TELEPHONE ENCOUNTER
RPM Notification: Disconnect  Actions: None     Total time (minutes) spent communicating with patient: 1    Pt became disconnected at  1112 . Contacted patient there was no answer. Contacted her son and he stated her reba closed. He opened up reba and pt reconnected at 1120 w/ SpO2 of 92%.

## 2022-07-01 NOTE — TELEPHONE ENCOUNTER
RPM Notification: Reconnect  Actions: None    Total time (minutes) spent communicating with patient: 3    Pt reconnected at 0803 w/ SpO2 of 95%. Pt stated she was on 2L of oxygen but will be decreasing to 1.5L. She put it down and I let her know we would keep an eye on her.

## 2022-07-02 ENCOUNTER — TELEPHONE (OUTPATIENT)
Dept: OTHER | Facility: MEDICAL CENTER | Age: 62
End: 2022-07-02
Payer: COMMERCIAL

## 2022-07-02 VITALS — HEART RATE: 87 BPM | RESPIRATION RATE: 12 BRPM | OXYGEN SATURATION: 89 %

## 2022-07-02 VITALS — OXYGEN SATURATION: 94 % | RESPIRATION RATE: 13 BRPM | HEART RATE: 78 BPM

## 2022-07-02 NOTE — TELEPHONE ENCOUNTER
RPM Notification: High Alert  Actions: None    Total time (minutes) spent communicating with patient: 3    At  0830 low SPo2 alert of 65%. Contacted patient. Patient states she was asleep and the oxygen fell from her nose. She put it back on now. Patient SPo2 increased to 89%.

## 2022-07-02 NOTE — TELEPHONE ENCOUNTER
RPM Notification: High Alert  Actions: none    Total time (minutes) spent communicating with patient: 5    At  1736 low SPo2 alert of 84%. Contacted patient. Patient states she doesn't know why she is alerting because she is on her oxygen tank. I asked her if she was feeling the oxygen and she stated she was not. She handed the phone to her granddaughter because she didn't know how to fix it. Grand daughter checked and the tank was on empty. I asked her if pt was home she stated pt was. I asked to put pt back to the condenser because it was recommended for her to be on those rather than the tanks when at home. Her granddaughter stated she would put her back on the condenser. Patient SPo2 increased to 89%.

## 2022-07-02 NOTE — TELEPHONE ENCOUNTER
RPM Notification: Disconnect  Actions: None    Total time (minutes) spent communicating with patient:0    Attempted to contact both pt and EC with no answer. Informed charge and advised to attempt again in a few minutes

## 2022-07-02 NOTE — TELEPHONE ENCOUNTER
RPM Notification: Disconnect and Patient Communication  Actions: Device Troubleshoot    Total time (minutes) spent communicating with patient: 1    Called pt no answer, Called EC stated she is okay just sleeping and will have pt reconnect

## 2022-07-02 NOTE — TELEPHONE ENCOUNTER
RPM Notification: Medium Alert  Actions: None    Total time (minutes) spent communicating with patient: 2    At  1708 low SPo2 alert of 88%. Contacted patient. Patient states she is doing well she went for a drive with her daughter and she took off her oxygen for a second and forgot she had it off she put it right now. Patient SPo2 increased to 93%.

## 2022-07-03 ENCOUNTER — TELEPHONE (OUTPATIENT)
Dept: OTHER | Facility: MEDICAL CENTER | Age: 62
End: 2022-07-03
Payer: COMMERCIAL

## 2022-07-03 VITALS — HEART RATE: 85 BPM | OXYGEN SATURATION: 97 % | RESPIRATION RATE: 19 BRPM

## 2022-07-03 VITALS — HEART RATE: 89 BPM | OXYGEN SATURATION: 94 %
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
## 2022-07-03 NOTE — TELEPHONE ENCOUNTER
RPM Notification: Disconnect  Actions: None    Total time (minutes) spent communicating with patient: 3    Pt became disconnected at  0803 . Contacted patient and patient reported her light was flashing red. Patient let me know her  was very upset that yesterday [facility] was dispatched at their home. She said her  was very upset because he is tired and us dispatching [facility] was not ok. I let her know that we had to dispatch because it was part of our program we tried to call several times and there was no answer. We called her, her  and her son. I told her that it was our protocol to dispatch [facility] if we could not get a hold of anyone. She wanted me to tell my supervisor to make sure that we just call from our line and not the  line to help. Patient reconnected at 0807 with SpO2 of 94%.

## 2022-07-03 NOTE — TELEPHONE ENCOUNTER
RPM Notification: Disconnect  Actions: None    Total time (minutes) spent communicating with patient: 1    Pt became disconnected at 0120 and remained disconnected for about 45 mins. First call made to patient at 0205 through [employer] was unsuccessful. Call went straight to voicemail but unable to leave msg as VM was full. In same call, emergency contact was called, no answer but was able to leave a message. Second round of calls were made at 0235, unable to reach patient as well as her ec. A voicemail was left as  Well as text to patient that [facility] will be dispatched out for a wellness check if no contact has been made with her in the next 10 minutes. [facility] called at 0248 and a wellness check was initiated.
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
## 2022-07-03 NOTE — TELEPHONE ENCOUNTER
RPM Notification: Disconnect  Actions: None    Total time (minutes) spent communicating with patient: 1    Received a call from pt's ec on pt's phone at 0412. He was extremely upset that [facility] was dispatched out to their home. I attempted to explain to him that I had made several attempts to contact the patient with no success because she was disconnected, and that he was also contacted a couple of times as well and even left two voicemails on his phone through the  service [employer]. He continued to yell and cursed at me stating why we did that, if we had called him from a local number he would have answered. I explained to him that I only called with the interpreting service because I do not speak Hungarian but he continue to talk over me, yelling and cursing. I informed him that we were just trying to make sure patient was okay, it is not okay for him to speak to me that way and politely hung up on him.

## 2022-07-19 ENCOUNTER — HOSPITAL ENCOUNTER (EMERGENCY)
Facility: MEDICAL CENTER | Age: 62
End: 2022-07-19
Attending: EMERGENCY MEDICINE
Payer: COMMERCIAL

## 2022-07-19 VITALS
HEART RATE: 101 BPM | SYSTOLIC BLOOD PRESSURE: 156 MMHG | RESPIRATION RATE: 16 BRPM | HEIGHT: 63 IN | BODY MASS INDEX: 28.95 KG/M2 | OXYGEN SATURATION: 95 % | DIASTOLIC BLOOD PRESSURE: 100 MMHG | WEIGHT: 163.36 LBS | TEMPERATURE: 97.6 F

## 2022-07-19 DIAGNOSIS — G44.209 ACUTE NON INTRACTABLE TENSION-TYPE HEADACHE: ICD-10-CM

## 2022-07-19 PROCEDURE — 99284 EMERGENCY DEPT VISIT MOD MDM: CPT

## 2022-07-19 PROCEDURE — 700105 HCHG RX REV CODE 258: Performed by: EMERGENCY MEDICINE

## 2022-07-19 PROCEDURE — A9270 NON-COVERED ITEM OR SERVICE: HCPCS | Performed by: EMERGENCY MEDICINE

## 2022-07-19 PROCEDURE — 700102 HCHG RX REV CODE 250 W/ 637 OVERRIDE(OP): Performed by: EMERGENCY MEDICINE

## 2022-07-19 PROCEDURE — 700111 HCHG RX REV CODE 636 W/ 250 OVERRIDE (IP): Performed by: EMERGENCY MEDICINE

## 2022-07-19 PROCEDURE — 96374 THER/PROPH/DIAG INJ IV PUSH: CPT

## 2022-07-19 PROCEDURE — 96375 TX/PRO/DX INJ NEW DRUG ADDON: CPT

## 2022-07-19 RX ORDER — SODIUM CHLORIDE, SODIUM LACTATE, POTASSIUM CHLORIDE, CALCIUM CHLORIDE 600; 310; 30; 20 MG/100ML; MG/100ML; MG/100ML; MG/100ML
1000 INJECTION, SOLUTION INTRAVENOUS ONCE
Status: COMPLETED | OUTPATIENT
Start: 2022-07-19 | End: 2022-07-19

## 2022-07-19 RX ORDER — ACETAMINOPHEN 500 MG
1000 TABLET ORAL ONCE
Status: COMPLETED | OUTPATIENT
Start: 2022-07-19 | End: 2022-07-19

## 2022-07-19 RX ORDER — DIPHENHYDRAMINE HYDROCHLORIDE 50 MG/ML
25 INJECTION INTRAMUSCULAR; INTRAVENOUS ONCE
Status: COMPLETED | OUTPATIENT
Start: 2022-07-19 | End: 2022-07-19

## 2022-07-19 RX ORDER — METOCLOPRAMIDE HYDROCHLORIDE 5 MG/ML
10 INJECTION INTRAMUSCULAR; INTRAVENOUS ONCE
Status: COMPLETED | OUTPATIENT
Start: 2022-07-19 | End: 2022-07-19

## 2022-07-19 RX ADMIN — SODIUM CHLORIDE, POTASSIUM CHLORIDE, SODIUM LACTATE AND CALCIUM CHLORIDE 1000 ML: 600; 310; 30; 20 INJECTION, SOLUTION INTRAVENOUS at 18:36

## 2022-07-19 RX ADMIN — DIPHENHYDRAMINE HYDROCHLORIDE 25 MG: 50 INJECTION INTRAMUSCULAR; INTRAVENOUS at 18:34

## 2022-07-19 RX ADMIN — METOCLOPRAMIDE 10 MG: 5 INJECTION, SOLUTION INTRAMUSCULAR; INTRAVENOUS at 18:34

## 2022-07-19 RX ADMIN — ACETAMINOPHEN 1000 MG: 500 TABLET ORAL at 18:34

## 2022-07-19 ASSESSMENT — FIBROSIS 4 INDEX: FIB4 SCORE: 1

## 2022-07-19 NOTE — ED TRIAGE NOTES
Kayla Sanaryne Hernández  62 y.o. female  Chief Complaint   Patient presents with   • Headache     Pt states severe headache for 5 days now.        Vitals:    07/19/22 1403   BP: (!) 158/95   Pulse: 77   Resp: 16   Temp: 36.9 °C (98.4 °F)   SpO2: 93%       Patient educated on triage process and encouraged to alert staff of any changes in condition.    Pt ambulatory to triage for the above complaint. Pt is on her baseline amount of home 02.     Pt's grandson used for translation during triage process.

## 2022-07-20 NOTE — ED PROVIDER NOTES
ED Provider Note    Scribed for Gustavo Noland M.D. by Barbara Arellano. 7/19/2022  5:18 PM    Primary care provider: Pcp Pt States None  Means of arrival: Walk in  History obtained from: patient   History limited by: none    CHIEF COMPLAINT  Chief Complaint   Patient presents with   • Headache     Pt states severe headache for 5 days now.        HPI  Kayla Hernández is a 62 y.o. female who presents to the Emergency Department for headache onset 5 days ago. Patients headache is located to the top and front of her head. She states her headache has been waxing and waning but not maximal at onset. She has been taking hydrocodone for her headache with temporary alleviation of her pain.     Patient has a history of stroke, however she denies any other symptoms similar to her stroke, including no weakness, numbness, or speech changes.     Patient endorses associated mild nausea this morning, but denies fevers, chills, chest pain, shortness of breath, or vomiting. Patient takes tizanidine, hydrocodone, and gabapentin daily. Denies recent changes to her medications. Additionally the patient states she had a heart attack 3 years ago.    REVIEW OF SYSTEMS  Pertinent positives include: headache and mild nausea (resolved). Pertinent negatives include: numbness, weakness, fevers, chills, chest pain, shortness of breath, or vomiting. See history of present illness. All other systems are negative.      PAST MEDICAL HISTORY   History of stroke and MI.     SURGICAL HISTORY   has a past surgical history that includes c-sec only,prev c-sec and orif, ankle (1/27/2011).    SOCIAL HISTORY  Social History     Tobacco Use   • Smoking status: Former Smoker   • Smokeless tobacco: Never Used   Vaping Use   • Vaping Use: Former   Substance Use Topics   • Alcohol use: Yes     Alcohol/week: 1.2 oz     Types: 2 Cans of beer per week     Comment: occas   • Drug use: No      Social History     Substance and Sexual Activity   Drug Use No  "      FAMILY HISTORY  Family History   Problem Relation Age of Onset   • Hypertension Mother    • Hypertension Father        CURRENT MEDICATIONS  Current Outpatient Medications   Medication Instructions   • albuterol 108 (90 Base) MCG/ACT Aero Soln inhalation aerosol 2 Puffs, Inhalation, EVERY 6 HOURS PRN   • BIOTIN PO 1 Tablet, Oral, DAILY   • gabapentin (NEURONTIN) 1,200 mg, Oral, EVERY BEDTIME, 2 tablets = 1,200 mg.   • HYDROcodone/acetaminophen (NORCO)  MG Tab 1 Tablet, Oral, EVERY 4 HOURS PRN   • methocarbamol (ROBAXIN) 750-1,500 mg, Oral, 3 TIMES DAILY PRN, 1 to 2 tablets = 750 to 1,500 mg.   • prednisoLONE acetate (PRED FORTE) 1 % Suspension 1 Drop, Left Eye, 2 TIMES DAILY         ALLERGIES  No Known Allergies    PHYSICAL EXAM  VITAL SIGNS: BP (!) 158/95   Pulse 77   Temp 36.9 °C (98.4 °F) (Oral)   Resp 16   Ht 1.6 m (5' 3\")   Wt 74.1 kg (163 lb 5.8 oz)   SpO2 93%   BMI 28.94 kg/m²     Constitutional: Alert in no apparent distress.  HENT: No signs of trauma, Bilateral external ears normal, Nose normal. Uvula midline.   Eyes: Pupils are equal and reactive, Conjunctiva normal, Non-icteric.   Neck: Normal range of motion, No tenderness, Supple, No stridor.   Lymphatic: No lymphadenopathy noted.   Cardiovascular: Regular rate and rhythm, no murmurs.   Thorax & Lungs: Normal breath sounds, No respiratory distress, No wheezing, No chest tenderness.   Abdomen:  Soft, No tenderness, No peritoneal signs, No masses, No pulsatile masses.   Skin: Warm, Dry, No erythema, No rash.   Back: No bony tenderness, No CVA tenderness.   Extremities: Intact distal pulses, No edema, No tenderness, No cyanosis.  Musculoskeletal: Good range of motion in all major joints. No tenderness to palpation or major deformities noted.   Neurologic: Alert , Normal motor function, Normal sensory function, No focal deficits noted. Cranial nerves II through XII intact.  5 out of 5 strength x4.  Sensation intact light touch.  Normal " finger-nose-finger.  Normal reflexes bilaterally.  No clonus. EOMI. PERRL.   Psychiatric: Affect normal, Judgment normal, Mood normal.     COURSE & MEDICAL DECISION MAKING  Nursing notes, VS, PMSFHx reviewed in chart.    62 y.o. female p/w chief complaint of headache onset 5 days ago.    5:18 PM Patient seen and examined at bedside. We will treat the patient with medication for her pain. Patient will be treated with Reglan, Benadryl, tylenol, and IV fluids for migraine.    I verified that the patient was wearing a mask and I was wearing appropriate PPE every time I entered the room. The patient's mask was on the patient at all times during my encounter except for a brief view of the oropharynx.     The differential diagnoses include but are not limited to:   #Headache  Headache not maximal at onset and feels similar to prior headaches, doubt SAH.     Pt denies numbness or weakness. Doubt CVA or ICH.  Pt given migraine cocktail in ED w/ resolution of sx prior to dc  Unclear etiology of trigger.    No household members w/ similar to suggest CO poisoning.   No fever or AMS to suggest encephilitis or meningitis.  Pt w/ unremarkable neuro exam.  Pt w/ resolution of sx prior to d/c.  Ambulates w/ steady gait w/o assistance.      6:58 PM - Patient headache is improved after medication. Discussed return precautions. Patient will be discharged at this time. She verbalizes agreement with discharge and plan of care.     The patient will return for new or worsening symptoms and is stable at the time of discharge.    DISPOSITION:  Patient will be discharged home in stable condition.    FOLLOW UP:  [facility], Emergency Dept  [address] [facility] [address] [phone]    If symptoms worsen    [facility]  [address] [facility] [address]  [phone]  In 3 days        OUTPATIENT MEDICATIONS:  Discharge Medication List as of 7/19/2022  7:32 PM          FINAL IMPRESSION  1. Acute non  intractable tension-type headache          Barbara CALVIN (Scribe), am scribing for, and in the presence of, Gustavo Noland M.D..    Electronically signed by: Barbara Arellano (Geoffibryan), 7/19/2022    Gustavo CALVIN M.D. personally performed the services described in this documentation, as scribed by Barbara Arellano in my presence, and it is both accurate and complete.    The note accurately reflects work and decisions made by me.  Gustavo Noland M.D.  7/19/2022  9:19 PM

## 2022-07-20 NOTE — ED NOTES
Pt is discharged. VSS. Paperwork explained and all questions answered. All belongings sent with pt upon departure. Pt ambulatory with a steady gait out of ED.  Pt satting at 98% room air upon discharge

## 2022-07-20 NOTE — ED NOTES
Patient ambulated from [facility] accompanied by grandson. Patient is primarily Italian speaking, grandson interpreting.     Chart up for ERP to see.

## 2022-09-07 ENCOUNTER — HOSPITAL ENCOUNTER (OUTPATIENT)
Dept: CARDIOLOGY | Facility: MEDICAL CENTER | Age: 62
End: 2022-09-07
Attending: PAIN MEDICINE
Payer: COMMERCIAL

## 2022-09-07 ENCOUNTER — HOSPITAL ENCOUNTER (OUTPATIENT)
Dept: LAB | Facility: MEDICAL CENTER | Age: 62
End: 2022-09-07
Attending: PAIN MEDICINE
Payer: COMMERCIAL

## 2022-09-07 ENCOUNTER — HOSPITAL ENCOUNTER (OUTPATIENT)
Dept: RADIOLOGY | Facility: MEDICAL CENTER | Age: 62
End: 2022-09-07
Attending: PAIN MEDICINE
Payer: COMMERCIAL

## 2022-09-07 DIAGNOSIS — Z01.810 PRE-OPERATIVE CARDIOVASCULAR EXAMINATION: ICD-10-CM

## 2022-09-07 DIAGNOSIS — Z01.812 PRE-OPERATIVE LABORATORY EXAMINATION: ICD-10-CM

## 2022-09-07 LAB
ALBUMIN SERPL BCP-MCNC: 4.8 G/DL (ref 3.2–4.9)
ALBUMIN/GLOB SERPL: 1.8 G/DL
ALP SERPL-CCNC: 136 U/L (ref 30–99)
ALT SERPL-CCNC: 16 U/L (ref 2–50)
ANION GAP SERPL CALC-SCNC: 8 MMOL/L (ref 7–16)
APPEARANCE UR: CLEAR
AST SERPL-CCNC: 22 U/L (ref 12–45)
BASOPHILS # BLD AUTO: 0.7 % (ref 0–1.8)
BASOPHILS # BLD: 0.04 K/UL (ref 0–0.12)
BILIRUB SERPL-MCNC: 0.5 MG/DL (ref 0.1–1.5)
BILIRUB UR QL STRIP.AUTO: NEGATIVE
BUN SERPL-MCNC: 18 MG/DL (ref 8–22)
CALCIUM SERPL-MCNC: 10.4 MG/DL (ref 8.5–10.5)
CHLORIDE SERPL-SCNC: 99 MMOL/L (ref 96–112)
CO2 SERPL-SCNC: 30 MMOL/L (ref 20–33)
COLOR UR: YELLOW
CREAT SERPL-MCNC: 0.66 MG/DL (ref 0.5–1.4)
EKG IMPRESSION: NORMAL
EOSINOPHIL # BLD AUTO: 0.09 K/UL (ref 0–0.51)
EOSINOPHIL NFR BLD: 1.5 % (ref 0–6.9)
ERYTHROCYTE [DISTWIDTH] IN BLOOD BY AUTOMATED COUNT: 52.8 FL (ref 35.9–50)
GFR SERPLBLD CREATININE-BSD FMLA CKD-EPI: 100 ML/MIN/1.73 M 2
GLOBULIN SER CALC-MCNC: 2.7 G/DL (ref 1.9–3.5)
GLUCOSE SERPL-MCNC: 110 MG/DL (ref 65–99)
GLUCOSE UR STRIP.AUTO-MCNC: NEGATIVE MG/DL
HCT VFR BLD AUTO: 45.6 % (ref 37–47)
HGB BLD-MCNC: 14.4 G/DL (ref 12–16)
IMM GRANULOCYTES # BLD AUTO: 0.03 K/UL (ref 0–0.11)
IMM GRANULOCYTES NFR BLD AUTO: 0.5 % (ref 0–0.9)
KETONES UR STRIP.AUTO-MCNC: NEGATIVE MG/DL
LEUKOCYTE ESTERASE UR QL STRIP.AUTO: NEGATIVE
LYMPHOCYTES # BLD AUTO: 1.71 K/UL (ref 1–4.8)
LYMPHOCYTES NFR BLD: 28.1 % (ref 22–41)
MCH RBC QN AUTO: 33.2 PG (ref 27–33)
MCHC RBC AUTO-ENTMCNC: 31.6 G/DL (ref 33.6–35)
MCV RBC AUTO: 105.1 FL (ref 81.4–97.8)
MICRO URNS: NORMAL
MONOCYTES # BLD AUTO: 0.54 K/UL (ref 0–0.85)
MONOCYTES NFR BLD AUTO: 8.9 % (ref 0–13.4)
NEUTROPHILS # BLD AUTO: 3.67 K/UL (ref 2–7.15)
NEUTROPHILS NFR BLD: 60.3 % (ref 44–72)
NITRITE UR QL STRIP.AUTO: NEGATIVE
NRBC # BLD AUTO: 0 K/UL
NRBC BLD-RTO: 0 /100 WBC
PH UR STRIP.AUTO: 6.5 [PH] (ref 5–8)
PLATELET # BLD AUTO: 223 K/UL (ref 164–446)
PMV BLD AUTO: 11.1 FL (ref 9–12.9)
POTASSIUM SERPL-SCNC: 4.3 MMOL/L (ref 3.6–5.5)
PROT SERPL-MCNC: 7.5 G/DL (ref 6–8.2)
PROT UR QL STRIP: NEGATIVE MG/DL
RBC # BLD AUTO: 4.34 M/UL (ref 4.2–5.4)
RBC UR QL AUTO: NEGATIVE
SODIUM SERPL-SCNC: 137 MMOL/L (ref 135–145)
SP GR UR STRIP.AUTO: 1.01
UROBILINOGEN UR STRIP.AUTO-MCNC: 0.2 MG/DL
WBC # BLD AUTO: 6.1 K/UL (ref 4.8–10.8)

## 2022-09-07 PROCEDURE — 36415 COLL VENOUS BLD VENIPUNCTURE: CPT

## 2022-09-07 PROCEDURE — 80053 COMPREHEN METABOLIC PANEL: CPT

## 2022-09-07 PROCEDURE — 93010 ELECTROCARDIOGRAM REPORT: CPT | Performed by: INTERNAL MEDICINE

## 2022-09-07 PROCEDURE — 93005 ELECTROCARDIOGRAM TRACING: CPT | Performed by: PAIN MEDICINE

## 2022-09-07 PROCEDURE — 81003 URINALYSIS AUTO W/O SCOPE: CPT

## 2022-09-07 PROCEDURE — 71046 X-RAY EXAM CHEST 2 VIEWS: CPT

## 2022-09-07 PROCEDURE — 85025 COMPLETE CBC W/AUTO DIFF WBC: CPT

## 2022-10-19 ENCOUNTER — HOSPITAL ENCOUNTER (OUTPATIENT)
Dept: LAB | Facility: MEDICAL CENTER | Age: 62
End: 2022-10-19
Attending: PHYSICIAN ASSISTANT
Payer: COMMERCIAL

## 2022-10-19 LAB
ALBUMIN SERPL BCP-MCNC: 4.7 G/DL (ref 3.2–4.9)
ALBUMIN/GLOB SERPL: 1.6 G/DL
ALP SERPL-CCNC: 111 U/L (ref 30–99)
ALT SERPL-CCNC: 14 U/L (ref 2–50)
ANION GAP SERPL CALC-SCNC: 9 MMOL/L (ref 7–16)
AST SERPL-CCNC: 20 U/L (ref 12–45)
BASOPHILS # BLD AUTO: 0.5 % (ref 0–1.8)
BASOPHILS # BLD: 0.03 K/UL (ref 0–0.12)
BILIRUB SERPL-MCNC: 0.5 MG/DL (ref 0.1–1.5)
BUN SERPL-MCNC: 16 MG/DL (ref 8–22)
CALCIUM SERPL-MCNC: 10.2 MG/DL (ref 8.5–10.5)
CHLORIDE SERPL-SCNC: 100 MMOL/L (ref 96–112)
CHOLEST SERPL-MCNC: 227 MG/DL (ref 100–199)
CO2 SERPL-SCNC: 30 MMOL/L (ref 20–33)
CREAT SERPL-MCNC: 0.5 MG/DL (ref 0.5–1.4)
EOSINOPHIL # BLD AUTO: 0.15 K/UL (ref 0–0.51)
EOSINOPHIL NFR BLD: 2.7 % (ref 0–6.9)
ERYTHROCYTE [DISTWIDTH] IN BLOOD BY AUTOMATED COUNT: 51.2 FL (ref 35.9–50)
FASTING STATUS PATIENT QL REPORTED: NORMAL
GFR SERPLBLD CREATININE-BSD FMLA CKD-EPI: 106 ML/MIN/1.73 M 2
GLOBULIN SER CALC-MCNC: 3 G/DL (ref 1.9–3.5)
GLUCOSE SERPL-MCNC: 98 MG/DL (ref 65–99)
HCT VFR BLD AUTO: 46.6 % (ref 37–47)
HDLC SERPL-MCNC: 71 MG/DL
HGB BLD-MCNC: 14.5 G/DL (ref 12–16)
IMM GRANULOCYTES # BLD AUTO: 0.02 K/UL (ref 0–0.11)
IMM GRANULOCYTES NFR BLD AUTO: 0.4 % (ref 0–0.9)
LDLC SERPL CALC-MCNC: 143 MG/DL
LYMPHOCYTES # BLD AUTO: 1.48 K/UL (ref 1–4.8)
LYMPHOCYTES NFR BLD: 26.8 % (ref 22–41)
MCH RBC QN AUTO: 31.7 PG (ref 27–33)
MCHC RBC AUTO-ENTMCNC: 31.1 G/DL (ref 33.6–35)
MCV RBC AUTO: 101.7 FL (ref 81.4–97.8)
MONOCYTES # BLD AUTO: 0.56 K/UL (ref 0–0.85)
MONOCYTES NFR BLD AUTO: 10.1 % (ref 0–13.4)
NEUTROPHILS # BLD AUTO: 3.28 K/UL (ref 2–7.15)
NEUTROPHILS NFR BLD: 59.5 % (ref 44–72)
NRBC # BLD AUTO: 0 K/UL
NRBC BLD-RTO: 0 /100 WBC
PLATELET # BLD AUTO: 190 K/UL (ref 164–446)
PMV BLD AUTO: 10.9 FL (ref 9–12.9)
POTASSIUM SERPL-SCNC: 4.3 MMOL/L (ref 3.6–5.5)
PROT SERPL-MCNC: 7.7 G/DL (ref 6–8.2)
RBC # BLD AUTO: 4.58 M/UL (ref 4.2–5.4)
SODIUM SERPL-SCNC: 139 MMOL/L (ref 135–145)
TRIGL SERPL-MCNC: 67 MG/DL (ref 0–149)
TSH SERPL DL<=0.005 MIU/L-ACNC: 0.53 UIU/ML (ref 0.38–5.33)
WBC # BLD AUTO: 5.5 K/UL (ref 4.8–10.8)

## 2022-10-19 PROCEDURE — 80061 LIPID PANEL: CPT

## 2022-10-19 PROCEDURE — 80053 COMPREHEN METABOLIC PANEL: CPT

## 2022-10-19 PROCEDURE — 36415 COLL VENOUS BLD VENIPUNCTURE: CPT

## 2022-10-19 PROCEDURE — 85025 COMPLETE CBC W/AUTO DIFF WBC: CPT

## 2022-10-19 PROCEDURE — 84443 ASSAY THYROID STIM HORMONE: CPT

## 2022-12-19 ENCOUNTER — APPOINTMENT (OUTPATIENT)
Dept: RADIOLOGY | Facility: MEDICAL CENTER | Age: 62
DRG: 175 | End: 2022-12-19
Attending: EMERGENCY MEDICINE
Payer: COMMERCIAL

## 2022-12-19 ENCOUNTER — HOSPITAL ENCOUNTER (INPATIENT)
Facility: MEDICAL CENTER | Age: 62
LOS: 2 days | DRG: 175 | End: 2022-12-21
Attending: EMERGENCY MEDICINE | Admitting: STUDENT IN AN ORGANIZED HEALTH CARE EDUCATION/TRAINING PROGRAM
Payer: COMMERCIAL

## 2022-12-19 DIAGNOSIS — R07.9 CHEST PAIN, UNSPECIFIED TYPE: ICD-10-CM

## 2022-12-19 DIAGNOSIS — I26.99 OTHER ACUTE PULMONARY EMBOLISM WITHOUT ACUTE COR PULMONALE (HCC): ICD-10-CM

## 2022-12-19 DIAGNOSIS — Z86.73 H/O: CVA (CEREBROVASCULAR ACCIDENT): ICD-10-CM

## 2022-12-19 DIAGNOSIS — D69.6 THROMBOCYTOPENIA (HCC): ICD-10-CM

## 2022-12-19 DIAGNOSIS — E78.2 MIXED HYPERLIPIDEMIA: ICD-10-CM

## 2022-12-19 DIAGNOSIS — R60.9 PERIPHERAL EDEMA: ICD-10-CM

## 2022-12-19 DIAGNOSIS — J96.21 ACUTE ON CHRONIC RESPIRATORY FAILURE WITH HYPOXIA (HCC): ICD-10-CM

## 2022-12-19 LAB
ALBUMIN SERPL BCP-MCNC: 4.3 G/DL (ref 3.2–4.9)
ALBUMIN/GLOB SERPL: 1.7 G/DL
ALP SERPL-CCNC: 413 U/L (ref 30–99)
ALT SERPL-CCNC: 16 U/L (ref 2–50)
ANION GAP SERPL CALC-SCNC: 8 MMOL/L (ref 7–16)
AST SERPL-CCNC: 20 U/L (ref 12–45)
BASOPHILS # BLD AUTO: 0.7 % (ref 0–1.8)
BASOPHILS # BLD: 0.05 K/UL (ref 0–0.12)
BILIRUB SERPL-MCNC: 0.4 MG/DL (ref 0.1–1.5)
BUN SERPL-MCNC: 7 MG/DL (ref 8–22)
CALCIUM ALBUM COR SERPL-MCNC: 9.3 MG/DL (ref 8.5–10.5)
CALCIUM SERPL-MCNC: 9.5 MG/DL (ref 8.5–10.5)
CHLORIDE SERPL-SCNC: 98 MMOL/L (ref 96–112)
CO2 SERPL-SCNC: 30 MMOL/L (ref 20–33)
CREAT SERPL-MCNC: 0.46 MG/DL (ref 0.5–1.4)
D DIMER PPP IA.FEU-MCNC: 1.12 UG/ML (FEU) (ref 0–0.5)
EKG IMPRESSION: NORMAL
EOSINOPHIL # BLD AUTO: 0.25 K/UL (ref 0–0.51)
EOSINOPHIL NFR BLD: 3.7 % (ref 0–6.9)
ERYTHROCYTE [DISTWIDTH] IN BLOOD BY AUTOMATED COUNT: 53.8 FL (ref 35.9–50)
GFR SERPLBLD CREATININE-BSD FMLA CKD-EPI: 108 ML/MIN/1.73 M 2
GLOBULIN SER CALC-MCNC: 2.5 G/DL (ref 1.9–3.5)
GLUCOSE SERPL-MCNC: 110 MG/DL (ref 65–99)
HCT VFR BLD AUTO: 47.3 % (ref 37–47)
HGB BLD-MCNC: 14.9 G/DL (ref 12–16)
IMM GRANULOCYTES # BLD AUTO: 0.02 K/UL (ref 0–0.11)
IMM GRANULOCYTES NFR BLD AUTO: 0.3 % (ref 0–0.9)
LYMPHOCYTES # BLD AUTO: 1.51 K/UL (ref 1–4.8)
LYMPHOCYTES NFR BLD: 22.6 % (ref 22–41)
MCH RBC QN AUTO: 30.8 PG (ref 27–33)
MCHC RBC AUTO-ENTMCNC: 31.5 G/DL (ref 33.6–35)
MCV RBC AUTO: 97.7 FL (ref 81.4–97.8)
MONOCYTES # BLD AUTO: 0.86 K/UL (ref 0–0.85)
MONOCYTES NFR BLD AUTO: 12.9 % (ref 0–13.4)
NEUTROPHILS # BLD AUTO: 3.98 K/UL (ref 2–7.15)
NEUTROPHILS NFR BLD: 59.8 % (ref 44–72)
NRBC # BLD AUTO: 0 K/UL
NRBC BLD-RTO: 0 /100 WBC
NT-PROBNP SERPL IA-MCNC: 137 PG/ML (ref 0–125)
PLATELET # BLD AUTO: 61 K/UL (ref 164–446)
PMV BLD AUTO: 12.5 FL (ref 9–12.9)
POTASSIUM SERPL-SCNC: 3.9 MMOL/L (ref 3.6–5.5)
PROT SERPL-MCNC: 6.8 G/DL (ref 6–8.2)
RBC # BLD AUTO: 4.84 M/UL (ref 4.2–5.4)
SODIUM SERPL-SCNC: 136 MMOL/L (ref 135–145)
TROPONIN T SERPL-MCNC: 9 NG/L (ref 6–19)
WBC # BLD AUTO: 6.7 K/UL (ref 4.8–10.8)

## 2022-12-19 PROCEDURE — 700117 HCHG RX CONTRAST REV CODE 255: Performed by: EMERGENCY MEDICINE

## 2022-12-19 PROCEDURE — 770020 HCHG ROOM/CARE - TELE (206)

## 2022-12-19 PROCEDURE — 700102 HCHG RX REV CODE 250 W/ 637 OVERRIDE(OP): Performed by: STUDENT IN AN ORGANIZED HEALTH CARE EDUCATION/TRAINING PROGRAM

## 2022-12-19 PROCEDURE — 71275 CT ANGIOGRAPHY CHEST: CPT

## 2022-12-19 PROCEDURE — 80053 COMPREHEN METABOLIC PANEL: CPT

## 2022-12-19 PROCEDURE — 83880 ASSAY OF NATRIURETIC PEPTIDE: CPT

## 2022-12-19 PROCEDURE — 85379 FIBRIN DEGRADATION QUANT: CPT

## 2022-12-19 PROCEDURE — A9270 NON-COVERED ITEM OR SERVICE: HCPCS | Performed by: STUDENT IN AN ORGANIZED HEALTH CARE EDUCATION/TRAINING PROGRAM

## 2022-12-19 PROCEDURE — 71045 X-RAY EXAM CHEST 1 VIEW: CPT

## 2022-12-19 PROCEDURE — 85025 COMPLETE CBC W/AUTO DIFF WBC: CPT

## 2022-12-19 PROCEDURE — 93005 ELECTROCARDIOGRAM TRACING: CPT

## 2022-12-19 PROCEDURE — 99285 EMERGENCY DEPT VISIT HI MDM: CPT

## 2022-12-19 PROCEDURE — 99223 1ST HOSP IP/OBS HIGH 75: CPT | Performed by: STUDENT IN AN ORGANIZED HEALTH CARE EDUCATION/TRAINING PROGRAM

## 2022-12-19 PROCEDURE — 700111 HCHG RX REV CODE 636 W/ 250 OVERRIDE (IP): Performed by: STUDENT IN AN ORGANIZED HEALTH CARE EDUCATION/TRAINING PROGRAM

## 2022-12-19 PROCEDURE — 84484 ASSAY OF TROPONIN QUANT: CPT

## 2022-12-19 PROCEDURE — 93005 ELECTROCARDIOGRAM TRACING: CPT | Performed by: EMERGENCY MEDICINE

## 2022-12-19 PROCEDURE — 36415 COLL VENOUS BLD VENIPUNCTURE: CPT

## 2022-12-19 RX ORDER — PROMETHAZINE HYDROCHLORIDE 12.5 MG/1
12.5-25 SUPPOSITORY RECTAL EVERY 4 HOURS PRN
Status: CANCELLED | OUTPATIENT
Start: 2022-12-19

## 2022-12-19 RX ORDER — LABETALOL HYDROCHLORIDE 5 MG/ML
10 INJECTION, SOLUTION INTRAVENOUS EVERY 4 HOURS PRN
Status: CANCELLED | OUTPATIENT
Start: 2022-12-19

## 2022-12-19 RX ORDER — LORAZEPAM 0.5 MG/1
0.5 TABLET ORAL EVERY 4 HOURS PRN
Status: DISCONTINUED | OUTPATIENT
Start: 2022-12-19 | End: 2022-12-21 | Stop reason: HOSPADM

## 2022-12-19 RX ORDER — POLYETHYLENE GLYCOL 3350 17 G/17G
1 POWDER, FOR SOLUTION ORAL
Status: DISCONTINUED | OUTPATIENT
Start: 2022-12-19 | End: 2022-12-21 | Stop reason: HOSPADM

## 2022-12-19 RX ORDER — ONDANSETRON 4 MG/1
4 TABLET, ORALLY DISINTEGRATING ORAL EVERY 4 HOURS PRN
Status: CANCELLED | OUTPATIENT
Start: 2022-12-19

## 2022-12-19 RX ORDER — GABAPENTIN 300 MG/1
600 CAPSULE ORAL 2 TIMES DAILY
Status: DISCONTINUED | OUTPATIENT
Start: 2022-12-19 | End: 2022-12-21 | Stop reason: HOSPADM

## 2022-12-19 RX ORDER — BISACODYL 10 MG
10 SUPPOSITORY, RECTAL RECTAL
Status: DISCONTINUED | OUTPATIENT
Start: 2022-12-19 | End: 2022-12-21 | Stop reason: HOSPADM

## 2022-12-19 RX ORDER — PROCHLORPERAZINE EDISYLATE 5 MG/ML
5-10 INJECTION INTRAMUSCULAR; INTRAVENOUS EVERY 4 HOURS PRN
Status: DISCONTINUED | OUTPATIENT
Start: 2022-12-19 | End: 2022-12-21 | Stop reason: HOSPADM

## 2022-12-19 RX ORDER — LORAZEPAM 2 MG/1
2 TABLET ORAL
Status: DISCONTINUED | OUTPATIENT
Start: 2022-12-19 | End: 2022-12-21 | Stop reason: HOSPADM

## 2022-12-19 RX ORDER — LORAZEPAM 2 MG/1
4 TABLET ORAL
Status: DISCONTINUED | OUTPATIENT
Start: 2022-12-19 | End: 2022-12-21 | Stop reason: HOSPADM

## 2022-12-19 RX ORDER — PROMETHAZINE HYDROCHLORIDE 25 MG/1
12.5-25 TABLET ORAL EVERY 4 HOURS PRN
Status: DISCONTINUED | OUTPATIENT
Start: 2022-12-19 | End: 2022-12-21 | Stop reason: HOSPADM

## 2022-12-19 RX ORDER — ONDANSETRON 4 MG/1
4 TABLET, ORALLY DISINTEGRATING ORAL EVERY 4 HOURS PRN
Status: DISCONTINUED | OUTPATIENT
Start: 2022-12-19 | End: 2022-12-21 | Stop reason: HOSPADM

## 2022-12-19 RX ORDER — LORAZEPAM 2 MG/ML
2 INJECTION INTRAMUSCULAR
Status: DISCONTINUED | OUTPATIENT
Start: 2022-12-19 | End: 2022-12-21 | Stop reason: HOSPADM

## 2022-12-19 RX ORDER — FUROSEMIDE 10 MG/ML
40 INJECTION INTRAMUSCULAR; INTRAVENOUS DAILY
Status: DISCONTINUED | OUTPATIENT
Start: 2022-12-20 | End: 2022-12-19

## 2022-12-19 RX ORDER — PROMETHAZINE HYDROCHLORIDE 12.5 MG/1
12.5-25 SUPPOSITORY RECTAL EVERY 4 HOURS PRN
Status: DISCONTINUED | OUTPATIENT
Start: 2022-12-19 | End: 2022-12-21 | Stop reason: HOSPADM

## 2022-12-19 RX ORDER — GABAPENTIN 600 MG/1
1200 TABLET ORAL
Status: DISCONTINUED | OUTPATIENT
Start: 2022-12-19 | End: 2022-12-19

## 2022-12-19 RX ORDER — BISACODYL 10 MG
10 SUPPOSITORY, RECTAL RECTAL
Status: CANCELLED | OUTPATIENT
Start: 2022-12-19

## 2022-12-19 RX ORDER — ENOXAPARIN SODIUM 100 MG/ML
40 INJECTION SUBCUTANEOUS DAILY
Status: DISCONTINUED | OUTPATIENT
Start: 2022-12-19 | End: 2022-12-19

## 2022-12-19 RX ORDER — ONDANSETRON 2 MG/ML
4 INJECTION INTRAMUSCULAR; INTRAVENOUS EVERY 4 HOURS PRN
Status: DISCONTINUED | OUTPATIENT
Start: 2022-12-19 | End: 2022-12-21 | Stop reason: HOSPADM

## 2022-12-19 RX ORDER — PROCHLORPERAZINE EDISYLATE 5 MG/ML
5-10 INJECTION INTRAMUSCULAR; INTRAVENOUS EVERY 4 HOURS PRN
Status: CANCELLED | OUTPATIENT
Start: 2022-12-19

## 2022-12-19 RX ORDER — TIZANIDINE 4 MG/1
2 TABLET ORAL EVERY EVENING
Status: DISCONTINUED | OUTPATIENT
Start: 2022-12-19 | End: 2022-12-21 | Stop reason: HOSPADM

## 2022-12-19 RX ORDER — LORAZEPAM 2 MG/ML
1 INJECTION INTRAMUSCULAR
Status: DISCONTINUED | OUTPATIENT
Start: 2022-12-19 | End: 2022-12-21 | Stop reason: HOSPADM

## 2022-12-19 RX ORDER — OXYCODONE HYDROCHLORIDE 10 MG/1
10 TABLET ORAL
Status: DISCONTINUED | OUTPATIENT
Start: 2022-12-19 | End: 2022-12-21

## 2022-12-19 RX ORDER — PROMETHAZINE HYDROCHLORIDE 25 MG/1
12.5-25 TABLET ORAL EVERY 4 HOURS PRN
Status: CANCELLED | OUTPATIENT
Start: 2022-12-19

## 2022-12-19 RX ORDER — TIZANIDINE 2 MG/1
2 TABLET ORAL EVERY EVENING
COMMUNITY

## 2022-12-19 RX ORDER — LABETALOL HYDROCHLORIDE 5 MG/ML
10 INJECTION, SOLUTION INTRAVENOUS EVERY 4 HOURS PRN
Status: DISCONTINUED | OUTPATIENT
Start: 2022-12-19 | End: 2022-12-21

## 2022-12-19 RX ORDER — ACETAMINOPHEN 325 MG/1
650 TABLET ORAL EVERY 6 HOURS PRN
Status: DISCONTINUED | OUTPATIENT
Start: 2022-12-19 | End: 2022-12-21 | Stop reason: HOSPADM

## 2022-12-19 RX ORDER — MORPHINE SULFATE 4 MG/ML
4 INJECTION INTRAVENOUS
Status: DISCONTINUED | OUTPATIENT
Start: 2022-12-19 | End: 2022-12-21 | Stop reason: HOSPADM

## 2022-12-19 RX ORDER — LORAZEPAM 1 MG/1
1 TABLET ORAL EVERY 4 HOURS PRN
Status: DISCONTINUED | OUTPATIENT
Start: 2022-12-19 | End: 2022-12-21 | Stop reason: HOSPADM

## 2022-12-19 RX ORDER — OXYCODONE HYDROCHLORIDE 5 MG/1
5 TABLET ORAL
Status: DISCONTINUED | OUTPATIENT
Start: 2022-12-19 | End: 2022-12-21

## 2022-12-19 RX ORDER — FUROSEMIDE 10 MG/ML
40 INJECTION INTRAMUSCULAR; INTRAVENOUS DAILY
Status: DISCONTINUED | OUTPATIENT
Start: 2022-12-19 | End: 2022-12-21

## 2022-12-19 RX ORDER — LORAZEPAM 2 MG/ML
1.5 INJECTION INTRAMUSCULAR
Status: DISCONTINUED | OUTPATIENT
Start: 2022-12-19 | End: 2022-12-21 | Stop reason: HOSPADM

## 2022-12-19 RX ORDER — ONDANSETRON 2 MG/ML
4 INJECTION INTRAMUSCULAR; INTRAVENOUS EVERY 4 HOURS PRN
Status: CANCELLED | OUTPATIENT
Start: 2022-12-19

## 2022-12-19 RX ORDER — FOLIC ACID 1 MG/1
1 TABLET ORAL DAILY
Status: DISCONTINUED | OUTPATIENT
Start: 2022-12-20 | End: 2022-12-21 | Stop reason: HOSPADM

## 2022-12-19 RX ORDER — LORAZEPAM 2 MG/ML
0.5 INJECTION INTRAMUSCULAR EVERY 4 HOURS PRN
Status: DISCONTINUED | OUTPATIENT
Start: 2022-12-19 | End: 2022-12-21 | Stop reason: HOSPADM

## 2022-12-19 RX ORDER — ACETAMINOPHEN 325 MG/1
650 TABLET ORAL EVERY 6 HOURS PRN
Status: CANCELLED | OUTPATIENT
Start: 2022-12-19

## 2022-12-19 RX ORDER — ENOXAPARIN SODIUM 100 MG/ML
1 INJECTION SUBCUTANEOUS EVERY 12 HOURS
Status: DISCONTINUED | OUTPATIENT
Start: 2022-12-19 | End: 2022-12-20

## 2022-12-19 RX ORDER — GAUZE BANDAGE 2" X 2"
100 BANDAGE TOPICAL DAILY
Status: DISCONTINUED | OUTPATIENT
Start: 2022-12-20 | End: 2022-12-21 | Stop reason: HOSPADM

## 2022-12-19 RX ORDER — POLYETHYLENE GLYCOL 3350 17 G/17G
1 POWDER, FOR SOLUTION ORAL
Status: CANCELLED | OUTPATIENT
Start: 2022-12-19

## 2022-12-19 RX ORDER — ENOXAPARIN SODIUM 100 MG/ML
40 INJECTION SUBCUTANEOUS DAILY
Status: CANCELLED | OUTPATIENT
Start: 2022-12-19

## 2022-12-19 RX ADMIN — GABAPENTIN 600 MG: 300 CAPSULE ORAL at 22:43

## 2022-12-19 RX ADMIN — TIZANIDINE 2 MG: 4 TABLET ORAL at 22:48

## 2022-12-19 RX ADMIN — OXYCODONE HYDROCHLORIDE 10 MG: 10 TABLET ORAL at 22:48

## 2022-12-19 RX ADMIN — IOHEXOL 55 ML: 350 INJECTION, SOLUTION INTRAVENOUS at 17:08

## 2022-12-19 RX ADMIN — FUROSEMIDE 40 MG: 10 INJECTION, SOLUTION INTRAMUSCULAR; INTRAVENOUS at 22:44

## 2022-12-19 RX ADMIN — ENOXAPARIN SODIUM 80 MG: 80 INJECTION SUBCUTANEOUS at 22:43

## 2022-12-19 ASSESSMENT — LIFESTYLE VARIABLES
NAUSEA AND VOMITING: NO NAUSEA AND NO VOMITING
DO YOU DRINK ALCOHOL: NO
HEADACHE, FULLNESS IN HEAD: NOT PRESENT
PAROXYSMAL SWEATS: NO SWEAT VISIBLE
AGITATION: NORMAL ACTIVITY
VISUAL DISTURBANCES: NOT PRESENT
ORIENTATION AND CLOUDING OF SENSORIUM: ORIENTED AND CAN DO SERIAL ADDITIONS
TOTAL SCORE: 1
ANXIETY: NO ANXIETY (AT EASE)
AUDITORY DISTURBANCES: NOT PRESENT
TREMOR: TREMOR NOT VISIBLE BUT CAN BE FELT, FINGERTIP TO FINGERTIP

## 2022-12-19 ASSESSMENT — ENCOUNTER SYMPTOMS
ABDOMINAL PAIN: 0
SHORTNESS OF BREATH: 1
FEVER: 0
CHILLS: 0
WHEEZING: 0
NAUSEA: 0
VOMITING: 0
PALPITATIONS: 0

## 2022-12-19 ASSESSMENT — FIBROSIS 4 INDEX
FIB4 SCORE: 5
FIB4 SCORE: 5

## 2022-12-19 ASSESSMENT — PAIN DESCRIPTION - PAIN TYPE
TYPE: ACUTE PAIN
TYPE: ACUTE PAIN

## 2022-12-19 NOTE — ED PROVIDER NOTES
ER Provider Note    Scribed for GERMAN BOX by Sukh Solitario. 12/19/2022  2:23 PM    Primary Care Provider: Marii Miramontes P.A.-C.  Means of Arrival: Walk-in  History obtained from: Patient    CHIEF COMPLAINT  Chief Complaint   Patient presents with    Leg Swelling     BLE swelling x 1 wk. SOB. Denies CP. 88% RA. 95% 4 L NC. RR 24.      LIMITATION TO HISTORY   Select: : None    HPI  Kayla Hernández is a 62 y.o. female who presents to the ED complaining of bilateral leg swelling onset one week ago. She also experiences shortness of breath and chest discomfort. She states that the chest symptoms have been present for two weeks and that it feels the same as when she had COVID-19. Patient denies any fever. No alleviating factors reported. Patient reports that her shortness of breath is exacerbated when ambulating. Patient reports being on oxygen at home when doing activities. She denies needing oxygen when sedentary. She also states that she had COVID-19 in June 2022. She also reports being under pain management treatment.    OUTSIDE HISTORIAN(S):  Select: Significant other     REVIEW OF SYSTEMS  Pertinent positives include bilateral leg swelling, shortness of breath and chest discomfort. Pertinent negatives include no fever.  All other systems reviewed and negative.     PAST MEDICAL HISTORY  History reviewed. No pertinent past medical history.    SURGICAL HISTORY  Past Surgical History:   Procedure Laterality Date    ORIF, ANKLE  1/27/2011    Performed by MIGDALIA STRICKLAND at SURGERY [facility] ORS    AL C-SEC ONLY,PREV C-SEC         FAMILY HISTORY  Family History   Problem Relation Age of Onset    Hypertension Mother     Hypertension Father        SOCIAL HISTORY   reports that she has quit smoking. She has never used smokeless tobacco. She reports current alcohol use of about 1.2 oz per week. She reports that she does not use drugs.    CURRENT MEDICATIONS  Previous Medications    ASPIRIN EC  (ECOTRIN) 81 MG TABLET DELAYED RESPONSE    Take 81 mg by mouth every day.    GABAPENTIN (NEURONTIN) 600 MG TABLET    Take 1,200 mg by mouth at bedtime. 2 tablets = 1,200 mg.    HYDROCODONE/ACETAMINOPHEN (NORCO)  MG TAB    Take 1 Tablet by mouth every four hours as needed for Severe Pain.    TIZANIDINE (ZANAFLEX) 2 MG TABLET    Take 2 mg by mouth every evening.       ALLERGIES  Patient has no known allergies.    PHYSICAL EXAM  Constitutional: Well developed, Well nourished, No acute distress, Non-toxic appearance.   HENT: Normocephalic, Atraumatic, Bilateral external ears normal, oropharynx moist, No oral exudates, Nose normal.   Eyes: Pupils are equal round and react to light, extraocular motions are intact, conjunctiva is normal, there are no signs of exudate.   Neck: Supple, no meningeal signs.  Lymphatic: No lymphadenopathy noted.   Cardiovascular: Regular rate and rhythm without murmurs gallops or rubs.   Thorax & Lungs: Lungs diminished, Crackles at bases. Breathing comfortably. Chest wall is nontender.  Abdomen: Soft, nontender, nondistended. Bowel sounds are present.   Skin: Warm, Dry, No erythema,   Back: No tenderness, No CVA tenderness.  Musculoskeletal: Good range of motion in all major joints. No tenderness to palpation or major deformities noted. Intact distal pulses, no clubbing, no cyanosis, Calf tenderness, 1+ pitting edema bilaterally.  Neurologic: Alert & oriented x 3, Moving all extremities. No gross abnormalities.    Psychiatric: Affect normal, Judgment normal, Mood normal.      VITAL SIGNS:   BP (!) 207/80   Pulse 61   Temp 36.7 °C (98 °F) (Temporal)   Resp 15   SpO2 100%       DIAGNOSTIC STUDIES    Labs:   Results for orders placed or performed during the hospital encounter of 12/19/22   CBC with Differential   Result Value Ref Range    WBC 6.7 4.8 - 10.8 K/uL    RBC 4.84 4.20 - 5.40 M/uL    Hemoglobin 14.9 12.0 - 16.0 g/dL    Hematocrit 47.3 (H) 37.0 - 47.0 %    MCV 97.7 81.4 - 97.8  fL    MCH 30.8 27.0 - 33.0 pg    MCHC 31.5 (L) 33.6 - 35.0 g/dL    RDW 53.8 (H) 35.9 - 50.0 fL    Platelet Count 61 (L) 164 - 446 K/uL    MPV 12.5 9.0 - 12.9 fL    Neutrophils-Polys 59.80 44.00 - 72.00 %    Lymphocytes 22.60 22.00 - 41.00 %    Monocytes 12.90 0.00 - 13.40 %    Eosinophils 3.70 0.00 - 6.90 %    Basophils 0.70 0.00 - 1.80 %    Immature Granulocytes 0.30 0.00 - 0.90 %    Nucleated RBC 0.00 /100 WBC    Neutrophils (Absolute) 3.98 2.00 - 7.15 K/uL    Lymphs (Absolute) 1.51 1.00 - 4.80 K/uL    Monos (Absolute) 0.86 (H) 0.00 - 0.85 K/uL    Eos (Absolute) 0.25 0.00 - 0.51 K/uL    Baso (Absolute) 0.05 0.00 - 0.12 K/uL    Immature Granulocytes (abs) 0.02 0.00 - 0.11 K/uL    NRBC (Absolute) 0.00 K/uL   Comp Metabolic Panel   Result Value Ref Range    Sodium 136 135 - 145 mmol/L    Potassium 3.9 3.6 - 5.5 mmol/L    Chloride 98 96 - 112 mmol/L    Co2 30 20 - 33 mmol/L    Anion Gap 8.0 7.0 - 16.0    Glucose 110 (H) 65 - 99 mg/dL    Bun 7 (L) 8 - 22 mg/dL    Creatinine 0.46 (L) 0.50 - 1.40 mg/dL    Calcium 9.5 8.5 - 10.5 mg/dL    AST(SGOT) 20 12 - 45 U/L    ALT(SGPT) 16 2 - 50 U/L    Alkaline Phosphatase 413 (H) 30 - 99 U/L    Total Bilirubin 0.4 0.1 - 1.5 mg/dL    Albumin 4.3 3.2 - 4.9 g/dL    Total Protein 6.8 6.0 - 8.2 g/dL    Globulin 2.5 1.9 - 3.5 g/dL    A-G Ratio 1.7 g/dL   CORRECTED CALCIUM   Result Value Ref Range    Correct Calcium 9.3 8.5 - 10.5 mg/dL   ESTIMATED GFR   Result Value Ref Range    GFR (CKD-EPI) 108 >60 mL/min/1.73 m 2   proBrain Natriuretic Peptide, NT   Result Value Ref Range    NT-proBNP 137 (H) 0 - 125 pg/mL   TROPONIN   Result Value Ref Range    Troponin T 9 6 - 19 ng/L   D-DIMER   Result Value Ref Range    D-Dimer Screen 1.12 (H) 0.00 - 0.50 ug/mL (FEU)   EKG (NOW)   Result Value Ref Range    Report       [facility] Emergency Dept.    Test Date:  2022-12-19  Pt Name:    LINDA SMITH                 Department: ER  MRN:        [id number]                       Room:  Gender:     Female                       Technician: [id number]  :        1960                   Requested By:ER TRIAGE PROTOCOL  Order #:    [id number]                    Reading MD: GERMAN BOX MD    Measurements  Intervals                                Axis  Rate:       67                           P:          13  ME:         208                          QRS:        -28  QRSD:       99                           T:          27  QT:         355  QTc:        375    Interpretive Statements  Sinus rhythm  Borderline left axis deviation  Borderline low voltage, extremity leads  Compared to ECG 2022 12:01:17  No significant changes  Electronically Signed On 2022 14:29:49 PST by GERMAN BOX MD       All labs reviewed by me.    EK Lead EKG interpreted by me as shown above.    Radiology:   CT-CTA CHEST PULMONARY ARTERY W/ RECONS   Final Result      1.  Right lower lobe segmental cement pulmonary embolism of unclear acuity.   2.  Elevated RV LV ratio (1.1).   3.  Likely subacute-chronic T7 compression fracture.   4.  Vertebral plana at T6.   5.  Bronchitis, likely chronic.   6.  Cardiomegaly.   7.  Atherosclerotic changes. Coronary artery atherosclerotic disease.   8.  5 mm right middle lobe pulmonary nodule.      Low Risk: No routine follow-up      High Risk: Optional CT at 12 months      Comments: Nodules less than 6 mm do not require routine follow-up, but certain patients at high risk with suspicious nodule morphology, upper lobe location, or both may warrant 12-month follow-up.      Low Risk - Minimal or absent history of smoking and of other known risk factors.      High Risk - History of smoking or of other known risk factors.      Note: These recommendations do not apply to lung cancer screening, patients with immunosuppression, or patients with known primary cancer.      Fleischner Society 2017 Guidelines for Management of Incidentally Detected Pulmonary Nodules in Adults          Dr. Adame discussed these findings with Dr. Victoria at 5:20 PM by Voalte on 12/19/2022      DX-CHEST-PORTABLE (1 VIEW)   Final Result      No acute cardiac or pulmonary abnormalities are identified.      EC-ECHOCARDIOGRAM COMPLETE W/O CONT    (Results Pending)     The radiologist's interpretation of all radiological studies have been reviewed by me.    COURSE & MEDICAL DECISION MAKING     Nursing notes, vital signs, PMSFSHx reviewed in chart     Differential diagnoses include but not limited to ACS vs Heart Failure vs Peripheral Edema.      DISCUSSION OF MANAGEMENT WITH OTHER PHYSICIANS, QHP OR APPROPRIATE SOURCE  Select: Admitting team Dr. Richards    INDEPENDENT INTERPRETATION OF STUDIES  EKG interpreted above I myself    ESCALATION OF CARE  Based on the presentation of the patient I have considered blood analysis.  and Based on the presentation of the patient I have considered diagnostic imaging.        DIAGNOSTICS TESTS CONSIDERED  D-dimer positive CT scan as above      PLAN   2:23 PM  Will order DX-Chest, Troponin, ProBrain Natriuretic Peptide, CBC w/ diff, CMP, and EKG to evaluate her complaints.    4:08 PM - Patient was reevaluated at bedside. Discussed lab and radiology results with the patient and informed them that the diagnostic studies show no signs of heart or kidney failure. I also informed the patient that her blood work showed low platelet levels. Ordered D-Dimer for evaluation. The patient had the opportunity to ask any questions. The plan for hospitalization was discussed with the patient given their current presentation of chest discomfort and diagnostic study results. Patient is understanding and agreeable to the plan for hospitalization.    4:22 PM - Ordered CT-CTA Chest Pulmonary Artery w/ recons for evaluation.    4:35 PM - Paged Hospitalist.    4:59 PM - Case discussed with Hospitalist Dr. Richards regarding admission. He agrees to evaluate the patient for  hospitalization.    COURSE  Patient presents to the ED for evaluation.  The patient currently is on oxygen secondary to apparently a longstanding COVID course from the summer.  The patient has been describing intermittent chest pain for a couple of weeks.  Initially did a D-dimer that was positive so I had ordered for a CT scan.  The patient is now on 4 L and with the intermittent chest pains I did also look at her cardiac enzymes and BNP there is no signs of acute elevation to her troponin BNP is relatively low.  Chest x-ray does show some cardiomegaly EKG was interpreted by myself as above.  At this point I do feel the patient should be admitted the hospital for further restratification for acute coronary syndrome however the chest CT scan has the cement segmental pulmonary embolism of unknown duration there which is most likely the cause of her chronic hypoxemia.  At this point we will admit the patient for further treatment and care I have spoken to the hospitalist for this admission.    DISPOSITION   Patient will be hospitalized by Dr. Richards.    CONDITION AT DISPOSITION  Guarded     FINAL IMPRESSION   1. Peripheral edema    2. Chest pain, unspecified type    3. Thrombocytopenia (HCC)           Sukh CALVIN (Geoffibryan), am scribing for, and in the presence of, William Victoria MD.    Electronically signed by: Sukh Solitario (Lakeisha), 12/19/2022    IWilliam MD. personally performed the services described in this documentation, as scribed by Sukh Solitario in my presence, and it is both accurate and complete.    The note accurately reflects work and decisions made by me.  William Victoria M.D.  12/19/2022  8:52 PM

## 2022-12-19 NOTE — ED TRIAGE NOTES
Chief Complaint   Patient presents with    Leg Swelling     BLE swelling x 1 wk. SOB. Denies CP. 88% RA. 95% 4 L NC. RR 24.         BP (!) 162/80   Pulse 89   Temp 36.7 °C (98 °F) (Temporal)   Resp 18   SpO2 95%

## 2022-12-20 ENCOUNTER — APPOINTMENT (OUTPATIENT)
Dept: CARDIOLOGY | Facility: MEDICAL CENTER | Age: 62
DRG: 175 | End: 2022-12-20
Attending: STUDENT IN AN ORGANIZED HEALTH CARE EDUCATION/TRAINING PROGRAM
Payer: COMMERCIAL

## 2022-12-20 PROBLEM — R91.1 PULMONARY NODULE LESS THAN 6 MM DETERMINED BY COMPUTED TOMOGRAPHY OF LUNG: Status: ACTIVE | Noted: 2022-12-20

## 2022-12-20 LAB
ANION GAP SERPL CALC-SCNC: 7 MMOL/L (ref 7–16)
BUN SERPL-MCNC: 8 MG/DL (ref 8–22)
CALCIUM SERPL-MCNC: 9 MG/DL (ref 8.5–10.5)
CHLORIDE SERPL-SCNC: 98 MMOL/L (ref 96–112)
CO2 SERPL-SCNC: 33 MMOL/L (ref 20–33)
CREAT SERPL-MCNC: 0.64 MG/DL (ref 0.5–1.4)
ERYTHROCYTE [DISTWIDTH] IN BLOOD BY AUTOMATED COUNT: 54.5 FL (ref 35.9–50)
GFR SERPLBLD CREATININE-BSD FMLA CKD-EPI: 100 ML/MIN/1.73 M 2
GLUCOSE SERPL-MCNC: 143 MG/DL (ref 65–99)
HCT VFR BLD AUTO: 43.5 % (ref 37–47)
HGB BLD-MCNC: 13.8 G/DL (ref 12–16)
LV EJECT FRACT  99904: 65
LV EJECT FRACT MOD 2C 99903: 52.3
LV EJECT FRACT MOD 4C 99902: 73.44
LV EJECT FRACT MOD BP 99901: 64.4
MAGNESIUM SERPL-MCNC: 2 MG/DL (ref 1.5–2.5)
MCH RBC QN AUTO: 31.1 PG (ref 27–33)
MCHC RBC AUTO-ENTMCNC: 31.7 G/DL (ref 33.6–35)
MCV RBC AUTO: 98 FL (ref 81.4–97.8)
PHOSPHATE SERPL-MCNC: 1.9 MG/DL (ref 2.5–4.5)
PLATELET # BLD AUTO: 72 K/UL (ref 164–446)
PMV BLD AUTO: 11 FL (ref 9–12.9)
POTASSIUM SERPL-SCNC: 3.9 MMOL/L (ref 3.6–5.5)
RBC # BLD AUTO: 4.44 M/UL (ref 4.2–5.4)
SODIUM SERPL-SCNC: 138 MMOL/L (ref 135–145)
TROPONIN T SERPL-MCNC: 9 NG/L (ref 6–19)
WBC # BLD AUTO: 6 K/UL (ref 4.8–10.8)

## 2022-12-20 PROCEDURE — 700102 HCHG RX REV CODE 250 W/ 637 OVERRIDE(OP): Performed by: STUDENT IN AN ORGANIZED HEALTH CARE EDUCATION/TRAINING PROGRAM

## 2022-12-20 PROCEDURE — 700111 HCHG RX REV CODE 636 W/ 250 OVERRIDE (IP): Performed by: STUDENT IN AN ORGANIZED HEALTH CARE EDUCATION/TRAINING PROGRAM

## 2022-12-20 PROCEDURE — 99232 SBSQ HOSP IP/OBS MODERATE 35: CPT

## 2022-12-20 PROCEDURE — 93306 TTE W/DOPPLER COMPLETE: CPT

## 2022-12-20 PROCEDURE — 85027 COMPLETE CBC AUTOMATED: CPT

## 2022-12-20 PROCEDURE — 83735 ASSAY OF MAGNESIUM: CPT

## 2022-12-20 PROCEDURE — 700102 HCHG RX REV CODE 250 W/ 637 OVERRIDE(OP): Performed by: NURSE PRACTITIONER

## 2022-12-20 PROCEDURE — 770020 HCHG ROOM/CARE - TELE (206)

## 2022-12-20 PROCEDURE — 36415 COLL VENOUS BLD VENIPUNCTURE: CPT

## 2022-12-20 PROCEDURE — 80048 BASIC METABOLIC PNL TOTAL CA: CPT

## 2022-12-20 PROCEDURE — 93306 TTE W/DOPPLER COMPLETE: CPT | Mod: 26 | Performed by: INTERNAL MEDICINE

## 2022-12-20 PROCEDURE — 84100 ASSAY OF PHOSPHORUS: CPT

## 2022-12-20 PROCEDURE — 84484 ASSAY OF TROPONIN QUANT: CPT

## 2022-12-20 PROCEDURE — A9270 NON-COVERED ITEM OR SERVICE: HCPCS | Performed by: STUDENT IN AN ORGANIZED HEALTH CARE EDUCATION/TRAINING PROGRAM

## 2022-12-20 PROCEDURE — A9270 NON-COVERED ITEM OR SERVICE: HCPCS | Performed by: NURSE PRACTITIONER

## 2022-12-20 RX ORDER — ENOXAPARIN SODIUM 100 MG/ML
1 INJECTION SUBCUTANEOUS EVERY 12 HOURS
Status: DISCONTINUED | OUTPATIENT
Start: 2022-12-20 | End: 2022-12-21

## 2022-12-20 RX ADMIN — OXYCODONE HYDROCHLORIDE 10 MG: 10 TABLET ORAL at 07:29

## 2022-12-20 RX ADMIN — OXYCODONE HYDROCHLORIDE 10 MG: 10 TABLET ORAL at 22:08

## 2022-12-20 RX ADMIN — ENOXAPARIN SODIUM 80 MG: 80 INJECTION SUBCUTANEOUS at 08:29

## 2022-12-20 RX ADMIN — FOLIC ACID 1 MG: 1 TABLET ORAL at 05:19

## 2022-12-20 RX ADMIN — ENOXAPARIN SODIUM 80 MG: 80 INJECTION SUBCUTANEOUS at 21:43

## 2022-12-20 RX ADMIN — THERA TABS 1 TABLET: TAB at 05:18

## 2022-12-20 RX ADMIN — GABAPENTIN 600 MG: 300 CAPSULE ORAL at 21:42

## 2022-12-20 RX ADMIN — MORPHINE SULFATE 4 MG: 4 INJECTION INTRAVENOUS at 00:22

## 2022-12-20 RX ADMIN — GABAPENTIN 600 MG: 300 CAPSULE ORAL at 08:29

## 2022-12-20 RX ADMIN — OXYCODONE HYDROCHLORIDE 10 MG: 10 TABLET ORAL at 16:57

## 2022-12-20 RX ADMIN — Medication 100 MG: at 05:19

## 2022-12-20 RX ADMIN — DIBASIC SODIUM PHOSPHATE, MONOBASIC POTASSIUM PHOSPHATE AND MONOBASIC SODIUM PHOSPHATE 500 MG: 852; 155; 130 TABLET ORAL at 10:18

## 2022-12-20 RX ADMIN — ACETAMINOPHEN 650 MG: 325 TABLET, FILM COATED ORAL at 07:29

## 2022-12-20 RX ADMIN — DIBASIC SODIUM PHOSPHATE, MONOBASIC POTASSIUM PHOSPHATE AND MONOBASIC SODIUM PHOSPHATE 500 MG: 852; 155; 130 TABLET ORAL at 21:43

## 2022-12-20 RX ADMIN — TIZANIDINE 2 MG: 4 TABLET ORAL at 16:57

## 2022-12-20 ASSESSMENT — LIFESTYLE VARIABLES
DOES PATIENT WANT TO STOP DRINKING: NO
TOTAL SCORE: 0
NAUSEA AND VOMITING: NO NAUSEA AND NO VOMITING
ALCOHOL_USE: YES
ORIENTATION AND CLOUDING OF SENSORIUM: ORIENTED AND CAN DO SERIAL ADDITIONS
EVER HAD A DRINK FIRST THING IN THE MORNING TO STEADY YOUR NERVES TO GET RID OF A HANGOVER: NO
AUDITORY DISTURBANCES: NOT PRESENT
ANXIETY: NO ANXIETY (AT EASE)
AGITATION: NORMAL ACTIVITY
VISUAL DISTURBANCES: NOT PRESENT
EVER FELT BAD OR GUILTY ABOUT YOUR DRINKING: NO
TOTAL SCORE: 0
HOW MANY TIMES IN THE PAST YEAR HAVE YOU HAD 5 OR MORE DRINKS IN A DAY: 0
TREMOR: TREMOR NOT VISIBLE BUT CAN BE FELT, FINGERTIP TO FINGERTIP
CONSUMPTION TOTAL: NEGATIVE
HEADACHE, FULLNESS IN HEAD: NOT PRESENT
HAVE PEOPLE ANNOYED YOU BY CRITICIZING YOUR DRINKING: NO
HAVE YOU EVER FELT YOU SHOULD CUT DOWN ON YOUR DRINKING: NO
AVERAGE NUMBER OF DAYS PER WEEK YOU HAVE A DRINK CONTAINING ALCOHOL: 1
TOTAL SCORE: 1
TOTAL SCORE: 0
PAROXYSMAL SWEATS: NO SWEAT VISIBLE
ON A TYPICAL DAY WHEN YOU DRINK ALCOHOL HOW MANY DRINKS DO YOU HAVE: 3

## 2022-12-20 ASSESSMENT — ENCOUNTER SYMPTOMS
BLURRED VISION: 0
FALLS: 0
MYALGIAS: 0
FOCAL WEAKNESS: 0
COUGH: 0
NAUSEA: 0
PALPITATIONS: 0
DIARRHEA: 0
EYE PAIN: 0
CHILLS: 0
SENSORY CHANGE: 0
SORE THROAT: 0
ABDOMINAL PAIN: 0
SHORTNESS OF BREATH: 1
FEVER: 0
SPEECH CHANGE: 0
DOUBLE VISION: 0
WHEEZING: 0
DIZZINESS: 0
VOMITING: 0
ORTHOPNEA: 0

## 2022-12-20 ASSESSMENT — COGNITIVE AND FUNCTIONAL STATUS - GENERAL
MOVING TO AND FROM BED TO CHAIR: A LITTLE
CLIMB 3 TO 5 STEPS WITH RAILING: A LOT
DRESSING REGULAR UPPER BODY CLOTHING: A LITTLE
MOVING FROM LYING ON BACK TO SITTING ON SIDE OF FLAT BED: A LITTLE
TURNING FROM BACK TO SIDE WHILE IN FLAT BAD: A LITTLE
SUGGESTED CMS G CODE MODIFIER DAILY ACTIVITY: CJ
DAILY ACTIVITIY SCORE: 22
MOBILITY SCORE: 16
DRESSING REGULAR LOWER BODY CLOTHING: A LITTLE
SUGGESTED CMS G CODE MODIFIER MOBILITY: CK
STANDING UP FROM CHAIR USING ARMS: A LOT
WALKING IN HOSPITAL ROOM: A LITTLE

## 2022-12-20 ASSESSMENT — PATIENT HEALTH QUESTIONNAIRE - PHQ9
2. FEELING DOWN, DEPRESSED, IRRITABLE, OR HOPELESS: NOT AT ALL
1. LITTLE INTEREST OR PLEASURE IN DOING THINGS: NOT AT ALL
SUM OF ALL RESPONSES TO PHQ9 QUESTIONS 1 AND 2: 0

## 2022-12-20 ASSESSMENT — PAIN DESCRIPTION - PAIN TYPE: TYPE: CHRONIC PAIN;ACUTE PAIN

## 2022-12-20 NOTE — ASSESSMENT & PLAN NOTE
She is on chronic 2L with ambulation at home since COVID infection June 2022.  CTA showing a pulmonary embolism, acuity unknown. May also have heart failure involvement  Currently on 2L at rest  - Continue to wean  - Walking O2 test when able to tolerate

## 2022-12-20 NOTE — PROGRESS NOTES
[facility] Medicine Daily Progress Note    Date of Service  12/20/2022    Chief Complaint  Kayla Hernández is a 62 y.o. female admitted 12/19/2022 with shortness of breath    Hospital Course  Kayla Hernández is a 62 y.o. female with medical history of CVA 15 years ago, COVID-19 in June 2022, and chronic home oxygen who presented 12/19/2022 to the ED for chief complaint of bilateral leg swelling and shortness of breath. She had 2 weeks of shortness of breath, worse on exertion. Since her COVID infection in June, she has worn 2L oxygen on exertion and is room air at rest but now was 88% SpO2 on room air.     Notable ED workup included troponin-T of 9, NT-proBNP of 137,  D-Dimer of 1.12, and unremarkable EKG and CXR. CTA chest revealed a right lower lobe segmental cement pulmonary embolism of unclear acuity, cardiomegaly, and bilateral dependent atelectasis. No pulmonary edema appreciated on CT or CXR. She was admitted to medical floor to initiate treatment of submassive PE and to complete further cardiac workup.    She did have a thrombocytopenia with 61k platelets on admission and her 81mg aspirin was held. She was started on 1mg/kg Lovenox with anticipation of DOAC on discharge. Telemetry and serial troponins remained stable. Echocardiogram ordered and pending.    Interval Problem Update  12/20/2022: Patient was seen and examined. Interview and exam was conducted with assistance of video  (Lexa #[id number]). She is sitting up in bed talking to family on the phone and is alert, calm, and pleasant. She tells me she is feeling much better today and is eager to go home. I did explain that she would likely need to go home on oxygen and an anticoagulant and answered any questions she had about her condition.   - Vital signs reviewed. Walking O2 sats obtained today. She is requiring 2L at rest and 4L on ambulation to keep SpO2 >90%. Vitals are otherwise stable with normal BP and heart rate.  - Labs  reviewed. Notable for phos 1.9, trop-T 9, platelets 72k.     I have discussed this patient's plan of care and discharge plan at IDT rounds today with Case Management, Nursing, Nursing leadership, and other members of the IDT team.    Consultants/Specialty  None    Code Status  Full Code    Disposition  Patient is not medically cleared for discharge.   Anticipate discharge to to home with organized home healthcare and close outpatient follow-up.  I have placed the appropriate orders for post-discharge needs.    Review of Systems  Review of Systems   Constitutional:  Negative for chills, fever and malaise/fatigue.   HENT:  Negative for congestion, ear pain and sore throat.    Eyes:  Negative for blurred vision, double vision and pain.   Respiratory:  Positive for shortness of breath. Negative for cough and wheezing.    Cardiovascular:  Positive for leg swelling. Negative for palpitations and orthopnea.   Gastrointestinal:  Negative for abdominal pain, diarrhea, nausea and vomiting.   Genitourinary:  Negative for dysuria, frequency and urgency.   Musculoskeletal:  Negative for falls and myalgias.   Skin:  Negative for itching and rash.   Neurological:  Negative for dizziness, sensory change, speech change and focal weakness.   All other systems reviewed and are negative.     Physical Exam  Temp:  [36 °C (96.8 °F)-37.3 °C (99.1 °F)] 37.2 °C (99 °F)  Pulse:  [64-78] 78  Resp:  [16-20] 18  BP: ()/(56-84) 121/72  SpO2:  [93 %-99 %] 93 %    Physical Exam  Vitals and nursing note reviewed.   Constitutional:       General: She is not in acute distress.     Appearance: Normal appearance. She is obese. She is not ill-appearing, toxic-appearing or diaphoretic.   HENT:      Head: Normocephalic.      Right Ear: External ear normal.      Left Ear: External ear normal.      Nose: Nose normal.      Mouth/Throat:      Mouth: Mucous membranes are moist.      Pharynx: Oropharynx is clear.   Eyes:      General: No scleral  icterus.     Conjunctiva/sclera: Conjunctivae normal.      Pupils: Pupils are equal, round, and reactive to light.   Cardiovascular:      Rate and Rhythm: Normal rate and regular rhythm.      Pulses: Normal pulses.      Heart sounds: Normal heart sounds.   Pulmonary:      Effort: Pulmonary effort is normal. No respiratory distress.      Breath sounds: Normal breath sounds. No stridor. No wheezing, rhonchi or rales.   Abdominal:      General: Abdomen is flat. Bowel sounds are normal.      Palpations: Abdomen is soft.   Musculoskeletal:         General: Swelling present. No tenderness.      Cervical back: Normal range of motion. No rigidity or tenderness.      Comments: Mild BLE non-pitting non-tender swelling, she tells me it is improved   Skin:     General: Skin is warm and dry.      Capillary Refill: Capillary refill takes less than 2 seconds.      Findings: No bruising, erythema or lesion.   Neurological:      General: No focal deficit present.      Mental Status: She is alert and oriented to person, place, and time. Mental status is at baseline.   Psychiatric:         Mood and Affect: Mood normal.         Behavior: Behavior normal.         Thought Content: Thought content normal.       Fluids    Intake/Output Summary (Last 24 hours) at 12/20/2022 1441  Last data filed at 12/20/2022 0000  Gross per 24 hour   Intake 360 ml   Output 1550 ml   Net -1190 ml       Laboratory  Recent Labs     12/19/22  1408 12/20/22  0100   WBC 6.7 6.0   RBC 4.84 4.44   HEMOGLOBIN 14.9 13.8   HEMATOCRIT 47.3* 43.5   MCV 97.7 98.0*   MCH 30.8 31.1   MCHC 31.5* 31.7*   RDW 53.8* 54.5*   PLATELETCT 61* 72*   MPV 12.5 11.0     Recent Labs     12/19/22  1408 12/20/22  0100   SODIUM 136 138   POTASSIUM 3.9 3.9   CHLORIDE 98 98   CO2 30 33   GLUCOSE 110* 143*   BUN 7* 8   CREATININE 0.46* 0.64   CALCIUM 9.5 9.0                   Imaging  CT-CTA CHEST PULMONARY ARTERY W/ RECONS   Final Result      1.  Right lower lobe segmental cement pulmonary  embolism of unclear acuity.   2.  Elevated RV LV ratio (1.1).   3.  Likely subacute-chronic T7 compression fracture.   4.  Vertebral plana at T6.   5.  Bronchitis, likely chronic.   6.  Cardiomegaly.   7.  Atherosclerotic changes. Coronary artery atherosclerotic disease.   8.  5 mm right middle lobe pulmonary nodule.      Low Risk: No routine follow-up      High Risk: Optional CT at 12 months      Comments: Nodules less than 6 mm do not require routine follow-up, but certain patients at high risk with suspicious nodule morphology, upper lobe location, or both may warrant 12-month follow-up.      Low Risk - Minimal or absent history of smoking and of other known risk factors.      High Risk - History of smoking or of other known risk factors.      Note: These recommendations do not apply to lung cancer screening, patients with immunosuppression, or patients with known primary cancer.      Fleischner Society 2017 Guidelines for Management of Incidentally Detected Pulmonary Nodules in Adults         Dr. Adame discussed these findings with Dr. Victoria at 5:20 PM by Voalte on 12/19/2022      DX-CHEST-PORTABLE (1 VIEW)   Final Result      No acute cardiac or pulmonary abnormalities are identified.      EC-ECHOCARDIOGRAM COMPLETE W/O CONT    (Results Pending)        Assessment/Plan  * Acute on chronic respiratory failure with hypoxia (HCC)- (present on admission)  Assessment & Plan  She is on chronic 2L with ambulation at home since COVID infection June 2022.  CTA showing a pulmonary embolism, acuity unknown. May also have heart failure involvement  Currently on 2L at rest  - Continue to wean  - Walking O2 test when able to tolerate    Acute pulmonary embolism (HCC)  Assessment & Plan  CTA demonstrating a right lower lobe segmental cement pulmonary embolism of unclear acuity  PESI Class II (81 points: age 62 pts + room air SpO2 <90% 20 pts)  Provoking factor could likely be her COVID-19 infection in June. No recent travel,  surgery, or immobility. No known coagulopathies.  - On 1mg/kg Lovenox  - Will need DOAC on DC for at least 3 months.  - Echo pending    Chest pain  Assessment & Plan  No chest pain today  Trending troponins, unchanged at 9  - Continue telemetry  - Echocardiogram pending    Chronic back pain- (present on admission)  Assessment & Plan  Chronic back pain  - Continue home medication gabapentin and PRNs  - Out of bed as tolerated    Pulmonary nodule less than 6 mm determined by computed tomography of lung  Assessment & Plan  Incidental finding on CT of 5 mm right middle lobe pulmonary nodule.  Given her risk factors, recommend further outpatient follow-up.    Thrombocytopenia (HCC)- (present on admission)  Assessment & Plan  Improving, plt 72.    Aspirin was held yesterday    H/O: CVA (cerebrovascular accident)- (present on admission)  Assessment & Plan  History of CVA 15 years ago.       VTE prophylaxis: therapeutic anticoagulation with enoxaparin    I have performed a physical exam and reviewed and updated ROS and Plan today (12/20/2022). In review of yesterday's note (12/19/2022), there are no changes except as documented above.

## 2022-12-20 NOTE — H&P
Hospital Medicine History & Physical Note    Date of Service  12/19/2022    Primary Care Physician  Marii Miramontes P.A.-C.    Consultants      Specialist Names:     Code Status  Full Code    Chief Complaint  Chief Complaint   Patient presents with    Leg Swelling     BLE swelling x 1 wk. SOB. Denies CP. 88% RA. 95% 4 L NC. RR 24.        History of Presenting Illness  Kayla Hernández is a 62 y.o. female who presented 12/19/2022 with respiratory failure on 2 L who presents with leg swelling.  Patient reports that the leg swelling started 1 week ago.  She reports that she does have occasional episodes of chest discomfort that is also been occurring over the last 2 weeks.  She reports she is chronically on oxygen when ambulating.  She uses 2 L.  She reports her dose of breath is recently worsened.  She reports that she drinks 3-4 beers per day.    I discussed the plan of care with patient and family.    Review of Systems  Review of Systems   Constitutional:  Negative for chills and fever.   Respiratory:  Positive for shortness of breath. Negative for wheezing.    Cardiovascular:  Positive for chest pain and leg swelling. Negative for palpitations.   Gastrointestinal:  Negative for abdominal pain, nausea and vomiting.   All other systems reviewed and are negative.    Past Medical History  History of COVID infection.  Chronically on 2 L of oxygen with exertion    Surgical History   has a past surgical history that includes pr c-sec only,prev c-sec and orif, ankle (1/27/2011).     Family History  family history includes Hypertension in her father and mother.   Family history reviewed with patient. There is no family history that is pertinent to the chief complaint.     Social History   reports that she has quit smoking. She has never used smokeless tobacco. She reports current alcohol use of about 1.2 oz per week. She reports that she does not use drugs.    Allergies  No Known Allergies    Medications  Prior to  Admission Medications   Prescriptions Last Dose Informant Patient Reported? Taking?   HYDROcodone/acetaminophen (NORCO)  MG Tab 12/19/2022 at 1230 Family Member Yes No   Sig: Take 1 Tablet by mouth every four hours as needed for Severe Pain.   aspirin EC (ECOTRIN) 81 MG Tablet Delayed Response 12/19/2022 at AM Family Member Yes Yes   Sig: Take 81 mg by mouth every day.   gabapentin (NEURONTIN) 600 MG tablet 12/18/2022 at PM Family Member Yes No   Sig: Take 1,200 mg by mouth at bedtime. 2 tablets = 1,200 mg.   tizanidine (ZANAFLEX) 2 MG tablet 12/18/2022 at PM Family Member Yes Yes   Sig: Take 2 mg by mouth every evening.      Facility-Administered Medications: None       Physical Exam  Temp:  [36.7 °C (98 °F)] 36.7 °C (98 °F)  Pulse:  [60-89] 71  Resp:  [15-20] 20  BP: (158-207)/(70-90) 174/84  SpO2:  [88 %-100 %] 97 %  Blood Pressure: (!) 184/79   Temperature: 36.7 °C (98 °F)   Pulse: 70   Respiration: 19   Pulse Oximetry: 98 %       Physical Exam  Vitals reviewed.   Constitutional:       General: She is not in acute distress.     Appearance: She is ill-appearing.   HENT:      Head: Normocephalic and atraumatic.   Eyes:      General: No scleral icterus.        Right eye: No discharge.         Left eye: No discharge.   Cardiovascular:      Rate and Rhythm: Normal rate and regular rhythm.      Heart sounds: Normal heart sounds.   Pulmonary:      Effort: Pulmonary effort is normal. No respiratory distress.      Breath sounds: Normal breath sounds.      Comments: On 4L NC  Abdominal:      General: There is no distension.      Tenderness: There is no abdominal tenderness. There is no guarding.   Musculoskeletal:         General: No tenderness.      Right lower leg: Edema present.      Left lower leg: Edema present.   Skin:     General: Skin is warm and dry.      Coloration: Skin is not jaundiced.   Neurological:      General: No focal deficit present.      Mental Status: She is alert and oriented to person, place,  and time.      Cranial Nerves: No cranial nerve deficit.   Psychiatric:         Mood and Affect: Mood normal.         Behavior: Behavior normal.       Laboratory:  Recent Labs     12/19/22  1408   WBC 6.7   RBC 4.84   HEMOGLOBIN 14.9   HEMATOCRIT 47.3*   MCV 97.7   MCH 30.8   MCHC 31.5*   RDW 53.8*   PLATELETCT 61*   MPV 12.5     Recent Labs     12/19/22  1408   SODIUM 136   POTASSIUM 3.9   CHLORIDE 98   CO2 30   GLUCOSE 110*   BUN 7*   CREATININE 0.46*   CALCIUM 9.5     Recent Labs     12/19/22  1408   ALTSGPT 16   ASTSGOT 20   ALKPHOSPHAT 413*   TBILIRUBIN 0.4   GLUCOSE 110*         Recent Labs     12/19/22  1408   NTPROBNP 137*         Recent Labs     12/19/22  1408   TROPONINT 9       Imaging:  CT-CTA CHEST PULMONARY ARTERY W/ RECONS   Final Result      1.  Right lower lobe segmental cement pulmonary embolism of unclear acuity.   2.  Elevated RV LV ratio (1.1).   3.  Likely subacute-chronic T7 compression fracture.   4.  Vertebral plana at T6.   5.  Bronchitis, likely chronic.   6.  Cardiomegaly.   7.  Atherosclerotic changes. Coronary artery atherosclerotic disease.   8.  5 mm right middle lobe pulmonary nodule.      Low Risk: No routine follow-up      High Risk: Optional CT at 12 months      Comments: Nodules less than 6 mm do not require routine follow-up, but certain patients at high risk with suspicious nodule morphology, upper lobe location, or both may warrant 12-month follow-up.      Low Risk - Minimal or absent history of smoking and of other known risk factors.      High Risk - History of smoking or of other known risk factors.      Note: These recommendations do not apply to lung cancer screening, patients with immunosuppression, or patients with known primary cancer.      Fleischner Society 2017 Guidelines for Management of Incidentally Detected Pulmonary Nodules in Adults         Dr. Adame discussed these findings with Dr. Victoria at 5:20 PM by Voalte on 12/19/2022      DX-CHEST-PORTABLE (1 VIEW)    Final Result      No acute cardiac or pulmonary abnormalities are identified.      EC-ECHOCARDIOGRAM COMPLETE W/O CONT    (Results Pending)       EKG:  I have personally reviewed the images and compared with prior images. and My impression is: [place] rhythm rate 67.  No ST elevation or depressions.    Assessment/Plan:  Justification for Admission Status  I anticipate this patient will require at least two midnights for appropriate medical management, necessitating inpatient admission because acute on chronic respiratory failure    Patient will need a  bed on EMERGENCY service .  The need is secondary to acute on chronic respiratory failure.    * Acute on chronic respiratory failure with hypoxia (HCC)- (present on admission)  Assessment & Plan  Patient reports she is preparing requiring supplemental oxygen since her COVID infection.  She is on 2 L with ambulation.  She reports she is not requiring oxygen at rest.  Currently on 4 L.  In addition to the pulmonary embolism also suspect the patient has heart failure.  CTA showing a pulmonary embolism  Full dose Lovenox  Echocardiogram    Acute pulmonary embolism (HCC)  Assessment & Plan  CTA demonstrating a right lower lobe pulmonary embolism.  There is how acute this is.  Full dose anticoagulation  Echo  NOAC on DC    Thrombocytopenia (HCC)- (present on admission)  Assessment & Plan  Plt 60.  Secondary to alcohol abuse.    Chest pain  Assessment & Plan  The 10-year ASCVD risk score (Rick DK, et al., 2019) is: 6.2%    Values used to calculate the score:      Age: 62 years      Sex: Female      Is Non- : No      Diabetic: No      Tobacco smoker: No      Systolic Blood Pressure: 174 mmHg      Is BP treated: No      HDL Cholesterol: 71 mg/dL      Total Cholesterol: 227 mg/dL  Patient endorsing chest pain likely secondary to her pulmonary embolism.  Trend troponin  Telemetry  Echocardiogram    H/O: CVA (cerebrovascular accident)- (present on  admission)  Assessment & Plan  Tree of CVA 15 years ago.  Continue aspirin    Chronic back pain- (present on admission)  Assessment & Plan  Chronic back pain  Continue home medication gabapentin and as needed pain meds      VTE prophylaxis: therapeutic anticoagulation with lovenox

## 2022-12-20 NOTE — PROGRESS NOTES
4 Eyes Skin Assessment Completed by NENA Sandoval and NENA Rutledge.    Head WDL  Ears Redness and Blanching  Nose WDL  Mouth WDL  Neck WDL  Breast/Chest WDL  Shoulder Blades WDL  Spine WDL  (R) Arm/Elbow/Hand Redness and Blanching  (L) Arm/Elbow/Hand Redness and Blanching  Abdomen WDL  Groin WDL  Scrotum/Coccyx/Buttocks Redness and Blanching  (R) Leg Redness, Blanching, and Edema  (L) Leg Redness, Blanching, and Edema  (R) Heel/Foot/Toe Redness, Blanching, Boggy, and Edema  (L) Heel/Foot/Toe Redness, Blanching, Boggy, and Edema          Devices In Places Tele Box and Pulse Ox      Interventions In Place Gray Ear Foams, Pillows, Heels Loaded W/Pillows, and Pressure Redistribution Mattress    Possible Skin Injury No    Pictures Uploaded Into Epic N/A  Wound Consult Placed N/A  RN Wound Prevention Protocol Ordered No

## 2022-12-20 NOTE — ASSESSMENT & PLAN NOTE
No chest pain today  Trending troponins, unchanged at 9  - Continue telemetry  - Echocardiogram pending

## 2022-12-20 NOTE — ASSESSMENT & PLAN NOTE
Incidental finding on CT of 5 mm right middle lobe pulmonary nodule.  Given her risk factors, recommend further outpatient follow-up.

## 2022-12-20 NOTE — PROGRESS NOTES
Received report from Asiya BARRETT. Patient transported to T8 by this RN. Patient is English speaking only, patient's  translated. Patient is AxO 4, pain 10/10 and medicated per MAR. Patient is on 2L NC, on tele box SR 67. Bed alarm on, bed locked and in lowest position. Call light and all belongings within reach.

## 2022-12-20 NOTE — PROGRESS NOTES
Received RN shift report. Patient AAOx4 and speaking in complete sentences without difficulty. Patient in no acute distress.

## 2022-12-20 NOTE — HOSPITAL COURSE
Kayla Hernández is a 62 y.o. female with medical history of CVA 15 years ago, COVID-19 in June 2022, and chronic home oxygen who presented 12/19/2022 to the ED for chief complaint of bilateral leg swelling and shortness of breath. She had 2 weeks of shortness of breath, worse on exertion. Since her COVID infection in June, she has worn 2L oxygen on exertion and is room air at rest but now was 88% SpO2 on room air.     Notable ED workup included troponin-T of 9, NT-proBNP of 137,  D-Dimer of 1.12, and unremarkable EKG and CXR. CTA chest revealed a right lower lobe segmental cement pulmonary embolism of unclear acuity, cardiomegaly, and bilateral dependent atelectasis. No pulmonary edema appreciated on CT or CXR. She was admitted to medical floor to initiate treatment of submassive PE and to complete further cardiac workup.    She did have a thrombocytopenia with 61k platelets on admission and her 81mg aspirin was held. She was started on 1mg/kg Lovenox with anticipation of DOAC on discharge. Telemetry and serial troponins remained stable. Echocardiogram ordered and pending.

## 2022-12-20 NOTE — CARE PLAN
The patient is Watcher - Medium risk of patient condition declining or worsening    Shift Goals  Clinical Goals: monitor oxygenation, pain manage  Patient Goals: pain manage  Family Goals: na    Progress made toward(s) clinical / shift goals:    Problem: Optimal Care for Alcohol Withdrawal  Goal: Optimal Care for the alcohol withdrawal patient  Outcome: Progressing  Note: Patient is showing no signs of alcohol withdrawal at the moment.      Problem: Pain - Standard  Goal: Alleviation of pain or a reduction in pain to the patient’s comfort goal  Outcome: Progressing  Note: Patient is able to verbalize pain level and location, medications on MAR were able to help patient sleep.        Patient is not progressing towards the following goals:

## 2022-12-20 NOTE — FACE TO FACE
"Face to Face Note  -  Durable Medical Equipment    ORLY Duron - NPI: [id number]  I certify that this patient is under my care and that they had a durable medical equipment(DME)face to face encounter by myself that meets the physician DME face-to-face encounter requirements with this patient on:    Date of encounter:   Patient:                    MRN:                       YOB: 2021  Kayla Hernández  [id number]  1960     The encounter with the patient was in whole, or in part, for the following medical condition, which is the primary reason for durable medical equipment:  Submassive Pulmonary Embolism post COVID-19 infection    I certify that, based on my findings, the following durable medical equipment is medically necessary:    Oxygen   HOME O2 Saturation Measurements:(Values must be present for Home Oxygen orders)  Room air sat at rest: 83  Room air sat with amb: 78  With liters of O2: 2, O2 sat at rest with O2: 92  With Liters of O2: 4, O2 sat with amb with O2 : 93  Is the patient mobile?: Yes  If patient feels more short of breath, they can go up to 6 liters per minute and contact healthcare provider.    Supporting Symptoms: The patient requires supplemental oxygen, as the following interventions have been tried with limited or no improvement: \"Ambulation with oximetry and \"Incentive spirometry.    My Clinical findings support the need for the above equipment due to:  Hypoxia  "

## 2022-12-20 NOTE — ED NOTES
Pt wheeled to the bathroom with oxygen, pt able to transfer from wheelchair to toilet without any issues

## 2022-12-21 ENCOUNTER — PHARMACY VISIT (OUTPATIENT)
Dept: PHARMACY | Facility: MEDICAL CENTER | Age: 62
End: 2022-12-21
Payer: COMMERCIAL

## 2022-12-21 VITALS
HEART RATE: 73 BPM | OXYGEN SATURATION: 91 % | SYSTOLIC BLOOD PRESSURE: 131 MMHG | WEIGHT: 162.7 LBS | TEMPERATURE: 97.7 F | BODY MASS INDEX: 29.94 KG/M2 | RESPIRATION RATE: 18 BRPM | DIASTOLIC BLOOD PRESSURE: 77 MMHG | HEIGHT: 62 IN

## 2022-12-21 PROBLEM — R07.9 CHEST PAIN: Status: RESOLVED | Noted: 2019-11-02 | Resolved: 2022-12-21

## 2022-12-21 PROCEDURE — 3E02340 INTRODUCTION OF INFLUENZA VACCINE INTO MUSCLE, PERCUTANEOUS APPROACH: ICD-10-PCS | Performed by: NURSE PRACTITIONER

## 2022-12-21 PROCEDURE — 700111 HCHG RX REV CODE 636 W/ 250 OVERRIDE (IP): Performed by: NURSE PRACTITIONER

## 2022-12-21 PROCEDURE — 90471 IMMUNIZATION ADMIN: CPT

## 2022-12-21 PROCEDURE — 99239 HOSP IP/OBS DSCHRG MGMT >30: CPT

## 2022-12-21 PROCEDURE — RXMED WILLOW AMBULATORY MEDICATION CHARGE

## 2022-12-21 PROCEDURE — 700102 HCHG RX REV CODE 250 W/ 637 OVERRIDE(OP): Performed by: STUDENT IN AN ORGANIZED HEALTH CARE EDUCATION/TRAINING PROGRAM

## 2022-12-21 PROCEDURE — 700111 HCHG RX REV CODE 636 W/ 250 OVERRIDE (IP): Performed by: STUDENT IN AN ORGANIZED HEALTH CARE EDUCATION/TRAINING PROGRAM

## 2022-12-21 PROCEDURE — 90686 IIV4 VACC NO PRSV 0.5 ML IM: CPT | Performed by: NURSE PRACTITIONER

## 2022-12-21 PROCEDURE — 700102 HCHG RX REV CODE 250 W/ 637 OVERRIDE(OP)

## 2022-12-21 PROCEDURE — A9270 NON-COVERED ITEM OR SERVICE: HCPCS

## 2022-12-21 PROCEDURE — A9270 NON-COVERED ITEM OR SERVICE: HCPCS | Performed by: STUDENT IN AN ORGANIZED HEALTH CARE EDUCATION/TRAINING PROGRAM

## 2022-12-21 RX ORDER — HYDROCODONE BITARTRATE AND ACETAMINOPHEN 10; 325 MG/1; MG/1
1 TABLET ORAL EVERY 4 HOURS PRN
Status: DISCONTINUED | OUTPATIENT
Start: 2022-12-21 | End: 2022-12-21 | Stop reason: HOSPADM

## 2022-12-21 RX ORDER — HYDRALAZINE HYDROCHLORIDE 20 MG/ML
20 INJECTION INTRAMUSCULAR; INTRAVENOUS EVERY 6 HOURS PRN
Status: DISCONTINUED | OUTPATIENT
Start: 2022-12-21 | End: 2022-12-21 | Stop reason: HOSPADM

## 2022-12-21 RX ADMIN — FUROSEMIDE 40 MG: 10 INJECTION, SOLUTION INTRAMUSCULAR; INTRAVENOUS at 05:19

## 2022-12-21 RX ADMIN — INFLUENZA A VIRUS A/VICTORIA/2570/2019 IVR-215 (H1N1) ANTIGEN (FORMALDEHYDE INACTIVATED), INFLUENZA A VIRUS A/DARWIN/9/2021 SAN-010 (H3N2) ANTIGEN (FORMALDEHYDE INACTIVATED), INFLUENZA B VIRUS B/PHUKET/3073/2013 ANTIGEN (FORMALDEHYDE INACTIVATED), AND INFLUENZA B VIRUS B/MICHIGAN/01/2021 ANTIGEN (FORMALDEHYDE INACTIVATED) 0.5 ML: 15; 15; 15; 15 INJECTION, SUSPENSION INTRAMUSCULAR at 14:30

## 2022-12-21 RX ADMIN — FOLIC ACID 1 MG: 1 TABLET ORAL at 05:19

## 2022-12-21 RX ADMIN — OXYCODONE HYDROCHLORIDE 10 MG: 10 TABLET ORAL at 07:48

## 2022-12-21 RX ADMIN — THERA TABS 1 TABLET: TAB at 05:19

## 2022-12-21 RX ADMIN — Medication 100 MG: at 05:19

## 2022-12-21 RX ADMIN — HYDROCODONE BITARTRATE AND ACETAMINOPHEN 1 TABLET: 10; 325 TABLET ORAL at 11:02

## 2022-12-21 RX ADMIN — ENOXAPARIN SODIUM 80 MG: 80 INJECTION SUBCUTANEOUS at 08:07

## 2022-12-21 ASSESSMENT — PAIN DESCRIPTION - PAIN TYPE: TYPE: CHRONIC PAIN

## 2022-12-21 NOTE — DISCHARGE SUMMARY
Discharge Summary    CHIEF COMPLAINT ON ADMISSION  Chief Complaint   Patient presents with    Leg Swelling     BLE swelling x 1 wk. SOB. Denies CP. 88% RA. 95% 4 L NC. RR 24.        Reason for Admission  Ankle Swelling     Admission Date  12/19/2022    CODE STATUS  Full Code    HPI & HOSPITAL COURSE  Kayla Hernández is a 62 y.o. female with medical history of CVA 15 years ago, COVID-19 in June 2022, and chronic home oxygen who presented 12/19/2022 to the ED for chief complaint of bilateral leg swelling and shortness of breath. She had 2 weeks of shortness of breath, worse on exertion. Since her COVID infection in June, she has worn 2L oxygen on exertion and is room air at rest but now was 88% SpO2 on room air.     Notable ED workup included troponin-T of 9, NT-proBNP of 137,  D-Dimer of 1.12, and unremarkable EKG and CXR. CTA chest revealed a right lower lobe segmental cement pulmonary embolism of unclear acuity, cardiomegaly, and bilateral dependent atelectasis. No hypotension. No pulmonary edema appreciated on CT or CXR. She was admitted to medical floor to initiate treatment of submassive PE and to complete further cardiac workup.    She did have a thrombocytopenia with 61k platelets on admission and her 81mg aspirin was held. She was started on 1mg/kg Lovenox with anticipation of DOAC on discharge. Telemetry and serial troponins remained stable. Echocardiogram was performed and revealed no acute concerns: LVEF 65%, RVSP 25, no valvular abnormalities or signs of right heart strain. Plts trending back up after aspirin held.    She has had a rapid resolution of her shortness of breath and pain and successfully ambulated several times with no SHOB on 4L oxygen on exertion, 2L at rest. Estimated PESI class II, low-intermediate risk. Due to her risk profile, resolution of symptoms and lack of right heart involvement, she was transitioned to oral Eliquis for outpatient anticoagulation. Incidentally she did have one  episode of asymptomatic 2 second sinus pause on telemetry while asleep, but no further episodes or bradycardia after that.     On the day of discharge she was seen and examined. She tells me with help of  she is actually feeling better than the last few months and has no pain or shortness of breath at all. She is hemodynamically stable with no hypotension. I explained anticoagulation and bleeding precautions to her. She lives on one-story and her  is a very involved caretaker who can stay with her and help her around the house. She is comfortable with home oxygen and already has all required equipment at home. I did offer remote O2 monitoring to her and referred her to our program. She is being sent home with an increased home O2 order of 2L at rest, 4L on exertion. She also is being sent on 3 months Eliquis with a loading period: 7 days 10mg BID followed by 3 months 5mg BID. She does take 81mg ASA at home for distant CVA but there is a concern for elevated bleeding risk when taken concomittantly with Eliquis. I curbsided with neuro who recommended holding aspirin during Eliquis therapy. Sent referrals to pulmonology for PE followup. Since she did have some evidence of CAD on her CTA chest and she had the sinus pause once, I did also offer her a referral to cardiology.    Therefore, she is discharged in good and stable condition to home with close outpatient follow-up.    The patient met 2-midnight criteria for an inpatient stay at the time of discharge.    Discharge Date  12/21/2022    FOLLOW UP ITEMS POST DISCHARGE  3 months Eliquis with loading dose  Hold aspirin while on Eliquis  Pulmonology referral  Cardiology referral  Remote O2 Monitoring referral  Follow closely with PCP  Bleeding precautions.    DISCHARGE DIAGNOSES  Principal Problem:    Acute on chronic respiratory failure with hypoxia (HCC) POA: Yes  Active Problems:    Acute pulmonary embolism (HCC) POA: Yes    Chronic back pain POA:  Yes    H/O: CVA (cerebrovascular accident) POA: Yes    Thrombocytopenia (HCC) POA: Yes    Pulmonary nodule less than 6 mm determined by computed tomography of lung POA: Yes  Resolved Problems:    Chest pain POA: Unknown      FOLLOW UP  NILTON Soler-C.  [address]  [facility] [id number]  [place] [address]  [phone]    Schedule an appointment as soon as possible for a visit today  Call your PCP as soon as you can to arrange appointment in the next 2-3 days.    [facility]    Follow up      [facility] - [address]ND  [address]  [place] [address]  [phone]  Schedule an appointment as soon as possible for a visit today  We have sent a referral for you to follow up with a pulmonologist. If you do not hear from their office by Friday, call for an appointment.    [facility] [address]  [address]  [place] [address]  [phone]  Schedule an appointment as soon as possible for a visit in 1 week(s)  We have sent a referral for you to follow up with a cardiologist. If you do not hear from their office by next week, call for an appointment.      MEDICATIONS ON DISCHARGE     Medication List        START taking these medications        Instructions   * apixaban 5mg Tabs  Commonly known as: ELIQUIS   Take 2 Tablets by mouth 2 times a day for 7 days, THEN take 1 tablet 2 times a day thereafter for 11 weeks. Indications: DVT/PE  Dose: 10 mg     * apixaban 5mg Tabs  Start taking on: December 28, 2022  Commonly known as: ELIQUIS   Take 1 Tablet by mouth 2 times a day for 77 days. Take 2 tablets (10mg) twice a day for 1 week, then take 1 tab (5mg) twice a day for 11 weeks.  Indications: DVT/PE  Dose: 5 mg           * This list has 2 medication(s) that are the same as other medications prescribed for you. Read the directions carefully, and ask your doctor or other care provider to review them with you.                 CONTINUE taking these medications        Instructions   gabapentin 600 MG tablet  Commonly known as: NEURONTIN   Take 1,200 mg by mouth at bedtime. 2 tablets = 1,200 mg.  Dose: 1,200 mg     HYDROcodone/acetaminophen  MG Tabs  Commonly known as: NORCO   Take 1 Tablet by mouth every four hours as needed for Severe Pain.  Dose: 1 Tablet     tizanidine 2 MG tablet  Commonly known as: ZANAFLEX   Take 2 mg by mouth every evening.  Dose: 2 mg            STOP taking these medications      aspirin EC 81 MG Tbec  Commonly known as: ECOTRIN              Allergies  No Known Allergies    DIET  Orders Placed This Encounter   Procedures    Diet Order Diet: Cardiac; Miscellaneous modifications: (optional): No Decaf, No Caffeine(for test)     Standing Status:   Standing     Number of Occurrences:   1     Order Specific Question:   Diet:     Answer:   Cardiac [6]     Order Specific Question:   Miscellaneous modifications: (optional)     Answer:   No Decaf, No Caffeine(for test) [11]       ACTIVITY  Light duty.  Weight bearing as tolerated    CONSULTATIONS  None    PROCEDURES  None    LABORATORY  Lab Results   Component Value Date    SODIUM 138 12/20/2022    POTASSIUM 3.9 12/20/2022    CHLORIDE 98 12/20/2022    CO2 33 12/20/2022    GLUCOSE 143 (H) 12/20/2022    BUN 8 12/20/2022    CREATININE 0.64 12/20/2022    CREATININE 0.7 03/28/2008        Lab Results   Component Value Date    WBC 6.0 12/20/2022    HEMOGLOBIN 13.8 12/20/2022    HEMATOCRIT 43.5 12/20/2022    PLATELETCT 72 (L) 12/20/2022        Total time of the discharge process exceeds 45 minutes.

## 2022-12-21 NOTE — CARE PLAN
The patient is Stable - Low risk of patient condition declining or worsening    Shift Goals  Clinical Goals: pain manage, monitor O2  Patient Goals: rest, pain manage  Family Goals: na    Progress made toward(s) clinical / shift goals:    Problem: Fall Risk  Goal: Patient will remain free from falls  Outcome: Progressing  Note: Patient uses call light appropriately and does not try to get out of bed without staff.      Problem: Respiratory  Goal: Patient will achieve/maintain optimum respiratory ventilation and gas exchange  Outcome: Progressing  Note: Home O2 eval was performed and appropriate home medical devices ordered.        Patient is not progressing towards the following goals:

## 2022-12-21 NOTE — RESPIRATORY CARE
REMOTE MONITORING PROGRAM by RESPIRATORY THERAPY  12/21/2022 at 3:13 PM by Guero Sumner             Patient declined Remote Monitoring Program. Patient participated in remote monitoring program earlier in the year and did not have good experience with the program on notifying the remote monitoring team when removing device and picking up the phone when remote monitoring team would try to contact her.

## 2022-12-21 NOTE — PROGRESS NOTES
Assumed care of patient, received bedside report from April RN. Assessment performed with . Patient is A&O X 4. Pain 10/10, medicated per MAR. Vital signs stable during day shift, on 2 L of oxygen NC. On tele monitor, SR 73. POC discussed with patient. Patient verbalized understanding. All questions answered. Call light within reach and fall precautions in place. Bed alarm on, bed locked and in lowest position.

## 2022-12-21 NOTE — PROGRESS NOTES
0359 Patient had a 2.1 second pause, blood pressure was taken and was 173/96. Per Harjit SIGALA, hold PRN labatelol due to pause.     0525 Patient's blood pressure was 171/92. Harjit Ngo notified, no new orders received.

## 2022-12-21 NOTE — PROGRESS NOTES
Monitor Summary:    Rhythm: SR  Rate: 63-81  Ectopy: 2.0, 2.1, 2.0 back to back pauses, mely'ed to the 30s briefly after pauses  Measurement : .20/.08/.36

## 2022-12-21 NOTE — DISCHARGE INSTRUCTIONS
While on Eliquis, do not continue your Aspirin unless otherwise instructed.  You are at increased risk of bleeding while on Eliquis. Please follow attached bleeding precautions closely - Let Mike do the heavy lifting for now.    We are arranging for followup with pulmonology for your lung blood clot. If you do not hear from their office within 2 days, please call for appointment.    We are also setting up remote O2 monitoring to help follow your oxygen levels and increase your safety at home. A respiratory aide will be by your room to educate you on this service.

## 2022-12-21 NOTE — DISCHARGE PLANNING
Case Management Discharge Planning    Admission Date: 12/19/2022  GMLOS: 3.9  ALOS: 2    6-Clicks ADL Score: 22  6-Clicks Mobility Score: 16  PT and/or OT Eval ordered: No  Post-acute Referrals Ordered: No  Post-acute Choice Obtained: No  Has referral(s) been sent to post-acute provider:  No      Anticipated Discharge Dispo: Discharge Disposition: Discharged to home/self care (01)    DME Needed: Yes    DME Ordered: Yes    Action(s) Taken: Patient Conference, Choice obtained, and Referral(s) sent    Escalations Completed: None    Medically Clear: Yes    Next Steps: RNCM met with patient and  at the bedside.   NENA Cain at bedside for translation.  Patient states she is on service with [facility].  Per patient/, son will bring an O2 tank from home around noon.  Per patient/, patient has multiple O2 tanks at home and a concentrator.  O2 order and F2F sent to [facility].  Provider notified via voalte.  No other CM needs identified at this time.  Please feel free to reach out if any needs arise.      Barriers to Discharge: None

## 2023-01-03 ENCOUNTER — TELEPHONE (OUTPATIENT)
Dept: HEALTH INFORMATION MANAGEMENT | Facility: OTHER | Age: 63
End: 2023-01-03
Payer: COMMERCIAL

## 2023-01-16 ENCOUNTER — TELEPHONE (OUTPATIENT)
Dept: HEALTH INFORMATION MANAGEMENT | Facility: OTHER | Age: 63
End: 2023-01-16
Payer: COMMERCIAL

## 2023-03-19 ENCOUNTER — HOSPITAL ENCOUNTER (EMERGENCY)
Facility: MEDICAL CENTER | Age: 63
End: 2023-03-19
Attending: EMERGENCY MEDICINE
Payer: COMMERCIAL

## 2023-03-19 ENCOUNTER — APPOINTMENT (OUTPATIENT)
Dept: RADIOLOGY | Facility: MEDICAL CENTER | Age: 63
End: 2023-03-19
Attending: EMERGENCY MEDICINE
Payer: COMMERCIAL

## 2023-03-19 VITALS
HEART RATE: 70 BPM | HEIGHT: 61 IN | OXYGEN SATURATION: 96 % | WEIGHT: 165 LBS | BODY MASS INDEX: 31.15 KG/M2 | TEMPERATURE: 98.2 F | RESPIRATION RATE: 16 BRPM | DIASTOLIC BLOOD PRESSURE: 77 MMHG | SYSTOLIC BLOOD PRESSURE: 150 MMHG

## 2023-03-19 DIAGNOSIS — R11.2 NAUSEA AND VOMITING, UNSPECIFIED VOMITING TYPE: ICD-10-CM

## 2023-03-19 DIAGNOSIS — K80.20 SYMPTOMATIC CHOLELITHIASIS: ICD-10-CM

## 2023-03-19 LAB
ALBUMIN SERPL BCP-MCNC: 4.4 G/DL (ref 3.2–4.9)
ALBUMIN/GLOB SERPL: 1.3 G/DL
ALP SERPL-CCNC: 190 U/L (ref 30–99)
ALT SERPL-CCNC: 39 U/L (ref 2–50)
ANION GAP SERPL CALC-SCNC: 15 MMOL/L (ref 7–16)
APPEARANCE UR: CLEAR
AST SERPL-CCNC: 31 U/L (ref 12–45)
BACTERIA #/AREA URNS HPF: NEGATIVE /HPF
BASOPHILS # BLD AUTO: 0.5 % (ref 0–1.8)
BASOPHILS # BLD: 0.05 K/UL (ref 0–0.12)
BILIRUB SERPL-MCNC: 0.5 MG/DL (ref 0.1–1.5)
BILIRUB UR QL STRIP.AUTO: NEGATIVE
BUN SERPL-MCNC: 12 MG/DL (ref 8–22)
CALCIUM ALBUM COR SERPL-MCNC: 9.6 MG/DL (ref 8.5–10.5)
CALCIUM SERPL-MCNC: 9.9 MG/DL (ref 8.5–10.5)
CHLORIDE SERPL-SCNC: 93 MMOL/L (ref 96–112)
CO2 SERPL-SCNC: 24 MMOL/L (ref 20–33)
COLOR UR: YELLOW
CREAT SERPL-MCNC: 0.59 MG/DL (ref 0.5–1.4)
EOSINOPHIL # BLD AUTO: 0.13 K/UL (ref 0–0.51)
EOSINOPHIL NFR BLD: 1.2 % (ref 0–6.9)
EPI CELLS #/AREA URNS HPF: NORMAL /HPF
ERYTHROCYTE [DISTWIDTH] IN BLOOD BY AUTOMATED COUNT: 60.1 FL (ref 35.9–50)
GFR SERPLBLD CREATININE-BSD FMLA CKD-EPI: 102 ML/MIN/1.73 M 2
GLOBULIN SER CALC-MCNC: 3.4 G/DL (ref 1.9–3.5)
GLUCOSE SERPL-MCNC: 158 MG/DL (ref 65–99)
GLUCOSE UR STRIP.AUTO-MCNC: NEGATIVE MG/DL
HCT VFR BLD AUTO: 49.5 % (ref 37–47)
HGB BLD-MCNC: 16.2 G/DL (ref 12–16)
HYALINE CASTS #/AREA URNS LPF: NORMAL /LPF
IMM GRANULOCYTES # BLD AUTO: 0.04 K/UL (ref 0–0.11)
IMM GRANULOCYTES NFR BLD AUTO: 0.4 % (ref 0–0.9)
KETONES UR STRIP.AUTO-MCNC: 15 MG/DL
LEUKOCYTE ESTERASE UR QL STRIP.AUTO: NEGATIVE
LIPASE SERPL-CCNC: 19 U/L (ref 11–82)
LYMPHOCYTES # BLD AUTO: 1.26 K/UL (ref 1–4.8)
LYMPHOCYTES NFR BLD: 12 % (ref 22–41)
MCH RBC QN AUTO: 32.8 PG (ref 27–33)
MCHC RBC AUTO-ENTMCNC: 32.7 G/DL (ref 33.6–35)
MCV RBC AUTO: 100.2 FL (ref 81.4–97.8)
MICRO URNS: ABNORMAL
MONOCYTES # BLD AUTO: 0.62 K/UL (ref 0–0.85)
MONOCYTES NFR BLD AUTO: 5.9 % (ref 0–13.4)
NEUTROPHILS # BLD AUTO: 8.42 K/UL (ref 2–7.15)
NEUTROPHILS NFR BLD: 80 % (ref 44–72)
NITRITE UR QL STRIP.AUTO: NEGATIVE
NRBC # BLD AUTO: 0 K/UL
NRBC BLD-RTO: 0 /100 WBC
PH UR STRIP.AUTO: 6.5 [PH] (ref 5–8)
PLATELET # BLD AUTO: 95 K/UL (ref 164–446)
PMV BLD AUTO: 10.1 FL (ref 9–12.9)
POTASSIUM SERPL-SCNC: 4.1 MMOL/L (ref 3.6–5.5)
PROT SERPL-MCNC: 7.8 G/DL (ref 6–8.2)
PROT UR QL STRIP: 30 MG/DL
RBC # BLD AUTO: 4.94 M/UL (ref 4.2–5.4)
RBC # URNS HPF: NORMAL /HPF
RBC UR QL AUTO: NEGATIVE
SODIUM SERPL-SCNC: 132 MMOL/L (ref 135–145)
SP GR UR STRIP.AUTO: 1.02
UROBILINOGEN UR STRIP.AUTO-MCNC: 0.2 MG/DL
WBC # BLD AUTO: 10.5 K/UL (ref 4.8–10.8)
WBC #/AREA URNS HPF: NORMAL /HPF

## 2023-03-19 PROCEDURE — 76705 ECHO EXAM OF ABDOMEN: CPT

## 2023-03-19 PROCEDURE — 36415 COLL VENOUS BLD VENIPUNCTURE: CPT

## 2023-03-19 PROCEDURE — 80053 COMPREHEN METABOLIC PANEL: CPT

## 2023-03-19 PROCEDURE — 99285 EMERGENCY DEPT VISIT HI MDM: CPT

## 2023-03-19 PROCEDURE — 96374 THER/PROPH/DIAG INJ IV PUSH: CPT

## 2023-03-19 PROCEDURE — 700111 HCHG RX REV CODE 636 W/ 250 OVERRIDE (IP): Performed by: EMERGENCY MEDICINE

## 2023-03-19 PROCEDURE — 700105 HCHG RX REV CODE 258: Performed by: EMERGENCY MEDICINE

## 2023-03-19 PROCEDURE — 96375 TX/PRO/DX INJ NEW DRUG ADDON: CPT

## 2023-03-19 PROCEDURE — 83690 ASSAY OF LIPASE: CPT

## 2023-03-19 PROCEDURE — 81001 URINALYSIS AUTO W/SCOPE: CPT

## 2023-03-19 PROCEDURE — 85025 COMPLETE CBC W/AUTO DIFF WBC: CPT

## 2023-03-19 RX ORDER — HYDROCODONE BITARTRATE AND ACETAMINOPHEN 5; 325 MG/1; MG/1
1 TABLET ORAL EVERY 6 HOURS PRN
Qty: 12 TABLET | Refills: 0 | Status: SHIPPED | OUTPATIENT
Start: 2023-03-19 | End: 2023-03-19 | Stop reason: SDUPTHER

## 2023-03-19 RX ORDER — SODIUM CHLORIDE, SODIUM LACTATE, POTASSIUM CHLORIDE, CALCIUM CHLORIDE 600; 310; 30; 20 MG/100ML; MG/100ML; MG/100ML; MG/100ML
1000 INJECTION, SOLUTION INTRAVENOUS ONCE
Status: COMPLETED | OUTPATIENT
Start: 2023-03-19 | End: 2023-03-19

## 2023-03-19 RX ORDER — ONDANSETRON 2 MG/ML
4 INJECTION INTRAMUSCULAR; INTRAVENOUS ONCE
Status: COMPLETED | OUTPATIENT
Start: 2023-03-19 | End: 2023-03-19

## 2023-03-19 RX ORDER — KETOROLAC TROMETHAMINE 30 MG/ML
15 INJECTION, SOLUTION INTRAMUSCULAR; INTRAVENOUS ONCE
Status: COMPLETED | OUTPATIENT
Start: 2023-03-19 | End: 2023-03-19

## 2023-03-19 RX ORDER — HYDROCODONE BITARTRATE AND ACETAMINOPHEN 5; 325 MG/1; MG/1
1 TABLET ORAL EVERY 6 HOURS PRN
Qty: 12 TABLET | Refills: 0 | Status: SHIPPED | OUTPATIENT
Start: 2023-03-19 | End: 2023-03-22

## 2023-03-19 RX ADMIN — KETOROLAC TROMETHAMINE 15 MG: 30 INJECTION, SOLUTION INTRAMUSCULAR at 16:55

## 2023-03-19 RX ADMIN — SODIUM CHLORIDE, POTASSIUM CHLORIDE, SODIUM LACTATE AND CALCIUM CHLORIDE 1000 ML: 600; 310; 30; 20 INJECTION, SOLUTION INTRAVENOUS at 16:55

## 2023-03-19 RX ADMIN — ONDANSETRON HYDROCHLORIDE 4 MG: 2 SOLUTION INTRAMUSCULAR; INTRAVENOUS at 16:55

## 2023-03-19 ASSESSMENT — PAIN DESCRIPTION - PAIN TYPE
TYPE: ACUTE PAIN
TYPE: ACUTE PAIN

## 2023-03-19 ASSESSMENT — FIBROSIS 4 INDEX: FIB4 SCORE: 4.24

## 2023-03-19 NOTE — ED PROVIDER NOTES
ED Provider Note    CHIEF COMPLAINT  Chief Complaint   Patient presents with    Abdominal Pain     Patient reports to abdominal pain x 1 hour, generalized through her abdomen.     N/V     Vomiting started when the pain started.        EXTERNAL RECORDS REVIEWED  Inpatient Notes from admission December 2022 for hypoxemia felt secondary to submassive PE    HPI/ROS  LIMITATION TO HISTORY   Select: Language Kiswahili,  Used   OUTSIDE HISTORIAN(S):  none    Kayla Hernández is a 62 y.o. female who presents with abdominal pain, nausea and vomiting.  Patient reports that she was eating lunch, some pork tacos, shortly after this she began to have some central abdominal pain, was sharp and crampy, she did feel sweaty with this and vomited a few times.  She states the pain has improved now, still minimally present.  Nausea does seem better.  She reports she was in her normal state of health when she awoke this morning, no recent illness, no fevers or chills, no chest pain or shortness of breath.  No diarrhea, no blood in her stool.  She has had a few prior episodes although not in a few years    PAST MEDICAL HISTORY       SURGICAL HISTORY   has a past surgical history that includes c-sec only,prev c-sec and orif, ankle (1/27/2011).    FAMILY HISTORY  Family History   Problem Relation Age of Onset    Hypertension Mother     Hypertension Father        SOCIAL HISTORY  Social History     Tobacco Use    Smoking status: Former    Smokeless tobacco: Never   Vaping Use    Vaping Use: Former   Substance and Sexual Activity    Alcohol use: Yes     Alcohol/week: 1.2 oz     Types: 2 Cans of beer per week     Comment: occas    Drug use: No    Sexual activity: Not on file       CURRENT MEDICATIONS  Home Medications       Reviewed by Cordell Aiken R.N. (Registered Nurse) on 03/19/23 at 1547  Med List Status: Partial     Medication Last Dose Status   apixaban (ELIQUIS) 5mg Tab  Active   gabapentin (NEURONTIN) 600 MG tablet   "Active   HYDROcodone/acetaminophen (NORCO)  MG Tab  Active   tizanidine (ZANAFLEX) 2 MG tablet  Active                    ALLERGIES  No Known Allergies    PHYSICAL EXAM  VITAL SIGNS: BP (!) 154/101   Pulse 67   Temp 35.9 °C (96.7 °F) (Temporal)   Resp 16   Ht 1.549 m (5' 1\")   Wt 74.8 kg (165 lb)   SpO2 95%   BMI 31.18 kg/m²      Pulse ox interpretation: I interpret this pulse ox as normal.  Constitutional: Alert in no apparent distress.  HENT: No signs of trauma, Bilateral external ears normal, Nose normal.   Eyes: Pupils are equal and reactive, Conjunctiva normal, Non-icteric.   Neck: Normal range of motion, No tenderness, Supple, No stridor.   Cardiovascular: Regular rate and rhythm, no murmurs.   Thorax & Lungs: Normal breath sounds, No respiratory distress, No wheezing, No chest tenderness.   Abdomen: Bowel sounds normal, Soft, mild epigastric tenderness, No masses, No pulsatile masses. No peritoneal signs.  Skin: Warm, Dry, No erythema, No rash.   Back: No bony tenderness, No CVA tenderness.   Extremities: Intact distal pulses, No edema, No tenderness, No cyanosis,  Negative Adriana's sign.   Musculoskeletal: Good range of motion in all major joints. No tenderness to palpation or major deformities noted.   Neurologic: Alert , Normal motor function, Normal sensory function, No focal deficits noted.   Psychiatric: Affect normal, Judgment normal, Mood normal.           DIAGNOSTIC STUDIES / PROCEDURES  Labs Reviewed   CBC WITH DIFFERENTIAL - Abnormal; Notable for the following components:       Result Value    Hemoglobin 16.2 (*)     Hematocrit 49.5 (*)     .2 (*)     MCHC 32.7 (*)     RDW 60.1 (*)     Platelet Count 95 (*)     Neutrophils-Polys 80.00 (*)     Lymphocytes 12.00 (*)     Neutrophils (Absolute) 8.42 (*)     All other components within normal limits   COMP METABOLIC PANEL - Abnormal; Notable for the following components:    Sodium 132 (*)     Chloride 93 (*)     Glucose 158 (*)     " Alkaline Phosphatase 190 (*)     All other components within normal limits   URINALYSIS - Abnormal; Notable for the following components:    Ketones 15 (*)     Protein 30 (*)     All other components within normal limits   LIPASE   ESTIMATED GFR   CORRECTED CALCIUM   URINE MICROSCOPIC (W/UA)         RADIOLOGY  I have independently interpreted the diagnostic imaging associated with this visit and am waiting the final reading from the radiologist.   My preliminary interpretation is as follows: gall stones  Radiologist interpretation:   US-RUQ   Final Result      1.  Cholelithiasis. No secondary findings indicative of acute cholecystitis.   2.  Nonspecific dilatation of the common hepatic duct measuring up to about 10.4 mm. No dilatation of intrahepatic biliary ducts.            COURSE & MEDICAL DECISION MAKING    ED Observation Status? Yes; I am placing the patient in to an observation status due to a diagnostic uncertainty as well as therapeutic intensity. Patient placed in observation status at 4:22 PM, 3/19/2023.     Observation plan is as follows: Serial abdominal examinations, antiemetics and symptom management, tolerance of orals    Upon Reevaluation, the patient's condition has: Improved; and will be discharged.    Patient discharged from ED Observation status at 6:37 PM   (Time) 03/19/23   (Date).     INITIAL ASSESSMENT, COURSE AND PLAN  Care Narrative: 4:22 PM  Patient presenting with abdominal pain.  At this point differential includes but is not limited to acute pathology such as gastroenteritis, metabolic disturbance, pancreatitis, gastritis as well as acute surgical pathology such as cholecystitis.  I did consider additional differential such as appendicitis, perforation obstruction but given the improvement in her pain and nature and location of her pain the seem unlikely..  Given this we will order diagnostic labs, advanced imaging including US and treat with the fluid, Zofran, Toradol for symptoms.   Additionally we will plan for observation to monitor symptoms and treatment and serial abdominal examinations.    6:37 PM  Patient is reevaluated, updated on results, she has had no return of the pain, states no nausea, no pain, abdomen soft and nontender, will plan for discharge      HYDRATION: Based on the patient's presentation of Acute Vomiting the patient was given IV fluids. IV Hydration was used because oral hydration was not as rapid as required. Upon recheck following hydration, the patient was improved.      ADDITIONAL PROBLEM LIST  #1 cholelithiasis.  Patient presenting with abdominal pain and vomiting that resolved and has not returned.  At this point seems most consistent with cholelithiasis.  She has no findings on her exam with any persistent pain or tenderness on exam or persistent nausea, or findings on labs or ultrasound to suggest cholecystitis or choledocholithiasis.  Lipase is normal without findings of pancreatitis as well.  Given the full resolution of her symptoms seems unlikely to represent acute surgical process as well.  She will be referred to general surgery as an outpatient    #2  Nausea and vomiting, likely related to above, has resolved at this point, she is well-appearing    #3  Chronic oxygen dependence.  Secondary to a COVID infection as well as a more recent pulmonary embolism.  No respiratory complaints, she was noted to be hypoxic on room air here but requires 2 L at baseline      DISPOSITION AND DISCUSSIONS      Barriers to care at this time, including but not limited to:  none .     Decision tools and prescription drugs considered including, but not limited to: Patient does not have any persistent pain to need prescriptions for pain medication or persistent nausea either    The patient will return for new or worsening symptoms and is stable at the time of discharge.    The patient is referred to a primary physician for blood pressure management, diabetic screening, and for all  other preventative health concerns.        DISPOSITION:  Patient will be discharged home in stable condition.    FOLLOW UP:  Marii Miramontes P.A.-C.  [address] [facility] [id number]  [facility] [address] [phone]      As needed    Teresa Veras M.D.  [address] [facility] [address]  [facility] [id number] [phone]    Schedule an appointment as soon as possible for a visit         OUTPATIENT MEDICATIONS:  New Prescriptions    No medications on file         FINAL DIAGNOSIS  1. Symptomatic cholelithiasis    2. Nausea and vomiting, unspecified vomiting type           Electronically signed by: Koffi Link M.D., 3/19/2023 4:22 PM

## 2023-03-19 NOTE — ED TRIAGE NOTES
"Kayla Hernández  62 y.o.    Chief Complaint   Patient presents with    Abdominal Pain     Patient reports to abdominal pain x 1 hour, generalized through her abdomen.     N/V     Vomiting started when the pain started.        /88   Pulse 70   Temp 35.9 °C (96.7 °F) (Temporal)   Resp 18   Ht 1.549 m (5' 1\")   Wt 74.8 kg (165 lb)   SpO2 98%   BMI 31.18 kg/m²     Protocol placed, pt educated to alert staff with any changes or concerns.   "

## 2023-03-19 NOTE — ED NOTES
Patient to room from [facility] with steady gait.  at bedside acting as  per patient request. Changed into gown, connected to monitor. Agree with triage note. Pain has resolved at this time. Patient reports similar episode in the past and states she had a low potassium level at that time. Charted for ERP. Call light within reach.     ERP at bedside.

## 2023-03-20 NOTE — ED NOTES
Patient prescription sent to patient preferred pharmacy, Patient requested to resend the prescription to [facility], provider requested.

## 2023-03-20 NOTE — ED NOTES
Patient given revised discharge paper work. Pt discharged to home. Education provided.Pt was given follow up instructions and prescriptions sent to pharmacy. Pt verbalized understanding of all instructions for discharge. Patient ob wheel chair accompanied by , alert and oriented x 4, out of ER with all belongings .

## 2023-03-20 NOTE — ED NOTES
Provider notified patient requesting for pain medication before discharge, patient  states they are due for medication refill tomorrow.

## 2023-03-20 NOTE — ED NOTES
IV access placed. Medicated patient per MAR. Patient denies further needs. Call light within reach.     US at bedside.

## 2023-03-20 NOTE — ED NOTES
Report received from previous shift NENA Devries Assumed patient care. Verified patient identification.  with unlabored respirations. Patient plan for discharge, awaiting reply back from provider for pain management. Patient updated.

## 2023-08-26 ENCOUNTER — HOSPITAL ENCOUNTER (OUTPATIENT)
Dept: LAB | Facility: MEDICAL CENTER | Age: 63
End: 2023-08-26
Attending: PHYSICIAN ASSISTANT
Payer: COMMERCIAL

## 2023-08-26 LAB
ALBUMIN SERPL BCP-MCNC: 4.9 G/DL (ref 3.2–4.9)
ALP SERPL-CCNC: 102 U/L (ref 30–99)
ALT SERPL-CCNC: 24 U/L (ref 2–50)
AST SERPL-CCNC: 27 U/L (ref 12–45)
BILIRUB CONJ SERPL-MCNC: <0.2 MG/DL (ref 0.1–0.5)
BILIRUB INDIRECT SERPL-MCNC: ABNORMAL MG/DL (ref 0–1)
BILIRUB SERPL-MCNC: 0.3 MG/DL (ref 0.1–1.5)
CHOLEST SERPL-MCNC: 208 MG/DL (ref 100–199)
HDLC SERPL-MCNC: 72 MG/DL
LDLC SERPL CALC-MCNC: 121 MG/DL
PROT SERPL-MCNC: 7.6 G/DL (ref 6–8.2)
TRIGL SERPL-MCNC: 77 MG/DL (ref 0–149)

## 2023-08-26 PROCEDURE — 80061 LIPID PANEL: CPT

## 2023-08-26 PROCEDURE — 36415 COLL VENOUS BLD VENIPUNCTURE: CPT

## 2023-08-26 PROCEDURE — 80076 HEPATIC FUNCTION PANEL: CPT

## 2023-11-08 ENCOUNTER — HOSPITAL ENCOUNTER (OUTPATIENT)
Dept: LAB | Facility: MEDICAL CENTER | Age: 63
End: 2023-11-08
Attending: PHYSICIAN ASSISTANT
Payer: COMMERCIAL

## 2023-11-08 LAB
25(OH)D3 SERPL-MCNC: 26 NG/ML (ref 30–100)
BASOPHILS # BLD AUTO: 0.6 % (ref 0–1.8)
BASOPHILS # BLD: 0.04 K/UL (ref 0–0.12)
CALCIUM SERPL-MCNC: 9.5 MG/DL (ref 8.5–10.5)
EOSINOPHIL # BLD AUTO: 0.25 K/UL (ref 0–0.51)
EOSINOPHIL NFR BLD: 3.7 % (ref 0–6.9)
ERYTHROCYTE [DISTWIDTH] IN BLOOD BY AUTOMATED COUNT: 53.2 FL (ref 35.9–50)
HCT VFR BLD AUTO: 45.1 % (ref 37–47)
HGB BLD-MCNC: 14.2 G/DL (ref 12–16)
IMM GRANULOCYTES # BLD AUTO: 0.02 K/UL (ref 0–0.11)
IMM GRANULOCYTES NFR BLD AUTO: 0.3 % (ref 0–0.9)
LYMPHOCYTES # BLD AUTO: 1.59 K/UL (ref 1–4.8)
LYMPHOCYTES NFR BLD: 23.6 % (ref 22–41)
MCH RBC QN AUTO: 33.8 PG (ref 27–33)
MCHC RBC AUTO-ENTMCNC: 31.5 G/DL (ref 32.2–35.5)
MCV RBC AUTO: 107.4 FL (ref 81.4–97.8)
MONOCYTES # BLD AUTO: 0.65 K/UL (ref 0–0.85)
MONOCYTES NFR BLD AUTO: 9.6 % (ref 0–13.4)
NEUTROPHILS # BLD AUTO: 4.19 K/UL (ref 1.82–7.42)
NEUTROPHILS NFR BLD: 62.2 % (ref 44–72)
NRBC # BLD AUTO: 0 K/UL
NRBC BLD-RTO: 0 /100 WBC (ref 0–0.2)
PLATELET # BLD AUTO: 199 K/UL (ref 164–446)
PMV BLD AUTO: 10.7 FL (ref 9–12.9)
RBC # BLD AUTO: 4.2 M/UL (ref 4.2–5.4)
WBC # BLD AUTO: 6.7 K/UL (ref 4.8–10.8)

## 2023-11-08 PROCEDURE — 85025 COMPLETE CBC W/AUTO DIFF WBC: CPT

## 2023-11-08 PROCEDURE — 82310 ASSAY OF CALCIUM: CPT

## 2023-11-08 PROCEDURE — 82306 VITAMIN D 25 HYDROXY: CPT

## 2023-11-08 PROCEDURE — 36415 COLL VENOUS BLD VENIPUNCTURE: CPT

## 2023-11-22 ENCOUNTER — TELEPHONE (OUTPATIENT)
Dept: HEALTH INFORMATION MANAGEMENT | Facility: OTHER | Age: 63
End: 2023-11-22
Payer: COMMERCIAL

## 2023-11-28 ENCOUNTER — TELEPHONE (OUTPATIENT)
Dept: HEALTH INFORMATION MANAGEMENT | Facility: OTHER | Age: 63
End: 2023-11-28
Payer: COMMERCIAL

## 2023-12-13 ENCOUNTER — OFFICE VISIT (OUTPATIENT)
Dept: SLEEP MEDICINE | Facility: MEDICAL CENTER | Age: 63
End: 2023-12-13
Payer: COMMERCIAL

## 2023-12-13 VITALS
DIASTOLIC BLOOD PRESSURE: 80 MMHG | HEART RATE: 80 BPM | SYSTOLIC BLOOD PRESSURE: 122 MMHG | HEIGHT: 63 IN | WEIGHT: 163 LBS | OXYGEN SATURATION: 93 % | BODY MASS INDEX: 28.88 KG/M2

## 2023-12-13 DIAGNOSIS — J96.11 CHRONIC RESPIRATORY FAILURE WITH HYPOXIA (HCC): ICD-10-CM

## 2023-12-13 PROCEDURE — 3079F DIAST BP 80-89 MM HG: CPT | Performed by: STUDENT IN AN ORGANIZED HEALTH CARE EDUCATION/TRAINING PROGRAM

## 2023-12-13 PROCEDURE — 99211 OFF/OP EST MAY X REQ PHY/QHP: CPT | Performed by: STUDENT IN AN ORGANIZED HEALTH CARE EDUCATION/TRAINING PROGRAM

## 2023-12-13 PROCEDURE — 99205 OFFICE O/P NEW HI 60 MIN: CPT | Performed by: STUDENT IN AN ORGANIZED HEALTH CARE EDUCATION/TRAINING PROGRAM

## 2023-12-13 PROCEDURE — 3074F SYST BP LT 130 MM HG: CPT | Performed by: STUDENT IN AN ORGANIZED HEALTH CARE EDUCATION/TRAINING PROGRAM

## 2023-12-13 RX ORDER — ALBUTEROL SULFATE 90 UG/1
2 AEROSOL, METERED RESPIRATORY (INHALATION) EVERY 6 HOURS PRN
COMMUNITY
Start: 2023-10-28

## 2023-12-13 RX ORDER — FUROSEMIDE 40 MG/1
TABLET ORAL
COMMUNITY
Start: 2023-10-28

## 2023-12-13 RX ORDER — ALENDRONATE SODIUM 70 MG/1
TABLET ORAL
COMMUNITY
Start: 2023-11-24

## 2023-12-13 RX ORDER — CARVEDILOL 6.25 MG/1
6.25 TABLET ORAL 2 TIMES DAILY WITH MEALS
COMMUNITY
Start: 2023-10-28

## 2023-12-13 RX ORDER — HYDROGEN PEROXIDE 2.65 ML/100ML
81 LIQUID ORAL; TOPICAL DAILY
COMMUNITY
Start: 2023-10-28

## 2023-12-13 RX ORDER — ATORVASTATIN CALCIUM 20 MG/1
20 TABLET, FILM COATED ORAL DAILY
COMMUNITY
Start: 2023-12-08

## 2023-12-13 RX ORDER — LOSARTAN POTASSIUM 25 MG/1
TABLET ORAL
COMMUNITY
Start: 2023-07-14

## 2023-12-13 ASSESSMENT — FIBROSIS 4 INDEX: FIB4 SCORE: 1.72

## 2023-12-13 ASSESSMENT — PATIENT HEALTH QUESTIONNAIRE - PHQ9: CLINICAL INTERPRETATION OF PHQ2 SCORE: 0

## 2023-12-15 PROBLEM — J96.11 CHRONIC RESPIRATORY FAILURE WITH HYPOXIA (HCC): Status: ACTIVE | Noted: 2023-12-15

## 2023-12-15 ASSESSMENT — ENCOUNTER SYMPTOMS
SHORTNESS OF BREATH: 1
WHEEZING: 0
NAUSEA: 0
COUGH: 0
HEMOPTYSIS: 0
FALLS: 0
CHILLS: 0
FOCAL WEAKNESS: 0
SPUTUM PRODUCTION: 0
EYE DISCHARGE: 0
FEVER: 0
VOMITING: 0

## 2023-12-15 NOTE — ASSESSMENT & PLAN NOTE
Had some GGOs on prior imaging in 2022 but this was during acute COVID infection. Her ongoing dyspnea is likely from her weight and scoliosis/back pain restricting her thoracic cage. But given her history of COVID, will get further w/u to evaluate for potential ILD    - PFTs  - HRCT  - TTE to eval LV and RV function sebastián w/hx of PE  - wean supplemental O2

## 2024-01-31 ENCOUNTER — TELEPHONE (OUTPATIENT)
Dept: HEALTH INFORMATION MANAGEMENT | Facility: OTHER | Age: 64
End: 2024-01-31

## 2024-02-16 ENCOUNTER — APPOINTMENT (OUTPATIENT)
Dept: SLEEP MEDICINE | Facility: MEDICAL CENTER | Age: 64
End: 2024-02-16
Attending: STUDENT IN AN ORGANIZED HEALTH CARE EDUCATION/TRAINING PROGRAM

## 2024-12-13 NOTE — ED NOTES
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       Procedure(s):  LEFT SECOND TOE HAMMERTOE CORRECTION    Surgeon(s):  Andry Danielle DPM    Assistant:  * No surgical staff found *    Anesthesia: Monitor Anesthesia Care with local 8 cc's of 0.5% marcaine     Estimated Blood Loss (mL): Minimal    Complications: None    Specimens:   ID Type Source Tests Collected by Time Destination   1 : left 2nd toe hypertrophic extensor tendon and proximal interphalangeal joint Tissue Foot SURGICAL PATHOLOGY Andry Danielle DPM 12/13/2024 0935    A : left 2nd toe cultures Tissue Foot, Left CULTURE, TISSUE (WITH GRAM STAIN) Andry Danielle DPM 12/13/2024 0940    B : left 2nd toe cultures Tissue Foot, Left CULTURE, ANAEROBIC Andry Danielle DPM 12/13/2024 0940        Implants:  * No implants in log *      Drains: * No LDAs found *    Findings:  Infection Present At Time Of Surgery (PATOS) (choose all levels that have infection present):  No infection present  Other Findings: significant hypertrophic joint with severely inflamed joint and tendon as well as capsule    Electronically signed by Andry Danielle DPM on 12/13/2024 at 10:05 AM   MD at bedside.

## 2025-01-27 NOTE — TELEPHONE ENCOUNTER
RPM Notification: Disconnect  Actions: None    Total time (minutes) spent communicating with patient: 3    0255-  Pt disconnected.  Called pt and was able to reconnect.      
Gen: alert and oriented x3  Lung: clear   CV: S1 S2   Abd: soft  Ext: No edema  neuro: CN 2-12 grossly intact, no gross motor or sensory deficits appreciated  Skin: normal